# Patient Record
Sex: FEMALE | Race: WHITE | NOT HISPANIC OR LATINO | Employment: OTHER | URBAN - METROPOLITAN AREA
[De-identification: names, ages, dates, MRNs, and addresses within clinical notes are randomized per-mention and may not be internally consistent; named-entity substitution may affect disease eponyms.]

---

## 2021-03-03 DIAGNOSIS — Z23 NEED FOR VACCINATION: ICD-10-CM

## 2021-10-08 ENCOUNTER — TELEPHONE (OUTPATIENT)
Dept: SCHEDULING | Facility: IMAGING CENTER | Age: 71
End: 2021-10-08

## 2021-10-12 ENCOUNTER — OFFICE VISIT (OUTPATIENT)
Dept: MEDICAL GROUP | Facility: PHYSICIAN GROUP | Age: 71
End: 2021-10-12
Payer: MEDICARE

## 2021-10-12 VITALS
OXYGEN SATURATION: 96 % | RESPIRATION RATE: 16 BRPM | BODY MASS INDEX: 30.25 KG/M2 | HEIGHT: 68 IN | TEMPERATURE: 98.2 F | WEIGHT: 199.6 LBS | SYSTOLIC BLOOD PRESSURE: 130 MMHG | DIASTOLIC BLOOD PRESSURE: 86 MMHG | HEART RATE: 86 BPM

## 2021-10-12 DIAGNOSIS — T78.49XA ALLERGIC REACTION TO COVID-19 VACCINE: ICD-10-CM

## 2021-10-12 DIAGNOSIS — Z13.0 SCREENING FOR DEFICIENCY ANEMIA: ICD-10-CM

## 2021-10-12 DIAGNOSIS — Z01.00 ENCOUNTER FOR VISION SCREENING: ICD-10-CM

## 2021-10-12 DIAGNOSIS — M25.561 CHRONIC PAIN OF BOTH KNEES: ICD-10-CM

## 2021-10-12 DIAGNOSIS — Z12.11 SCREENING FOR COLORECTAL CANCER: ICD-10-CM

## 2021-10-12 DIAGNOSIS — G89.29 CHRONIC PAIN OF BOTH KNEES: ICD-10-CM

## 2021-10-12 DIAGNOSIS — H16.009 CORNEAL ULCER, UNSPECIFIED LATERALITY: ICD-10-CM

## 2021-10-12 DIAGNOSIS — J45.20 MILD INTERMITTENT ASTHMA WITHOUT COMPLICATION: ICD-10-CM

## 2021-10-12 DIAGNOSIS — E89.0 POSTOPERATIVE HYPOTHYROIDISM: ICD-10-CM

## 2021-10-12 DIAGNOSIS — Z12.12 SCREENING FOR COLORECTAL CANCER: ICD-10-CM

## 2021-10-12 DIAGNOSIS — G89.29 CHRONIC LOW BACK PAIN, UNSPECIFIED BACK PAIN LATERALITY, UNSPECIFIED WHETHER SCIATICA PRESENT: ICD-10-CM

## 2021-10-12 DIAGNOSIS — M25.562 CHRONIC PAIN OF BOTH KNEES: ICD-10-CM

## 2021-10-12 DIAGNOSIS — F33.0 MILD EPISODE OF RECURRENT MAJOR DEPRESSIVE DISORDER (HCC): ICD-10-CM

## 2021-10-12 DIAGNOSIS — Z13.6 SCREENING FOR CARDIOVASCULAR CONDITION: ICD-10-CM

## 2021-10-12 DIAGNOSIS — K21.9 GASTROESOPHAGEAL REFLUX DISEASE, UNSPECIFIED WHETHER ESOPHAGITIS PRESENT: ICD-10-CM

## 2021-10-12 DIAGNOSIS — Z13.1 ENCOUNTER FOR SCREENING FOR DIABETES MELLITUS: ICD-10-CM

## 2021-10-12 DIAGNOSIS — Z11.52 ENCOUNTER FOR SCREENING FOR COVID-19: ICD-10-CM

## 2021-10-12 DIAGNOSIS — M54.50 CHRONIC LOW BACK PAIN, UNSPECIFIED BACK PAIN LATERALITY, UNSPECIFIED WHETHER SCIATICA PRESENT: ICD-10-CM

## 2021-10-12 DIAGNOSIS — Z78.0 POST-MENOPAUSAL: ICD-10-CM

## 2021-10-12 PROBLEM — F32.9 MDD (MAJOR DEPRESSIVE DISORDER): Status: ACTIVE | Noted: 2021-10-12

## 2021-10-12 PROBLEM — Z87.828 HISTORY OF CORNEAL ABRASION: Status: ACTIVE | Noted: 2021-10-12

## 2021-10-12 PROBLEM — J45.909 ASTHMA: Status: ACTIVE | Noted: 2021-10-12

## 2021-10-12 PROCEDURE — 99204 OFFICE O/P NEW MOD 45 MIN: CPT | Performed by: INTERNAL MEDICINE

## 2021-10-12 ASSESSMENT — PATIENT HEALTH QUESTIONNAIRE - PHQ9: CLINICAL INTERPRETATION OF PHQ2 SCORE: 0

## 2021-10-12 NOTE — PROGRESS NOTES
Subjective:     CC: Establish care    HISTORY OF THE PRESENT ILLNESS: Patient is a 71 y.o. female. This pleasant patient is here today to establish care and discuss the following issues:    The patient has recently relocated to the [facility].  She states she had the Alberto [employer] Alberto vaccine and developed a severe allergic reaction.  Her symptoms were whole body rash and edema, as well as some throat swelling.  No shortness of breath.  No anaphylaxis.  She did not receive care at the ED or hospital.  She was managed by her PCP.  She required 6 weeks of prednisone. The patient would like to have her antibody level tested to help determine if and when she should get a COVID-19 booster given her severe allergic reaction.    The patient had a thyroidectomy for multiple thyroid nodules, with pathology benign.  The patient has been stable on her regimen of levothyroxine 100 mcg daily and liothyronine 5 mcg daily.  She does report a recent 50 pound weight gain.    The patient reports chronic back and bilateral knee pain.  Imaging has shown osteoarthritis/degenerative disc disease.  The patient had left knee arthroscopy versus TKR in 2013 and right knee arthroscopy versus TKR in 2020.  She has also received corticosteroid injections to her lower back and bilateral knees.  She takes cyclobenzaprine 10 mg as needed and Norco 5-325 as needed.  She does not need refills of the of these medications at this time. She also had a lower back procedure in 1991 that was called Chemo Papaine, it was a papaya injection that was popular during that time.    The patient reports recurrent corneal ulcers for which she requires ciprofloxacin 0.3% eyedrops for the recurrences.    Allergies: Patient has no allergy information on record.    Current Outpatient Medications Ordered in Epic   Medication Sig Dispense Refill   • liothyronine (CYTOMEL) 5 MCG Tab Take 5 mcg by mouth every day.     • levothyroxine (SYNTHROID) 100 MCG Tab Take 100  mcg by mouth every morning on an empty stomach.     • omeprazole (PRILOSEC) 20 MG delayed-release capsule Take 20 mg by mouth every day.     • escitalopram (LEXAPRO) 10 MG Tab Take 10 mg by mouth every day.     • budesonide-formoterol (SYMBICORT) 160-4.5 MCG/ACT Aerosol Inhale 2 Puffs 2 times a day.     • cyclobenzaprine (FLEXERIL) 10 mg Tab Take 10 mg by mouth 3 times a day as needed.     • ciprofloxacin (CILOXIN) 0.3 % Solution Administer 1 Drop into both eyes 2 times a day as needed for Other. for recurrent corneal ulceration     • HYDROcodone-acetaminophen (NORCO) 5-325 MG Tab per tablet Take 1 Tablet by mouth every four hours as needed for Moderate Pain.     • Omega-3 Fatty Acids (FISH OIL) 1200 MG Cap Take 2,400 mg by mouth every day.     • Ferrous Sulfate 27 MG Tab Take 27 mg by mouth every day.     • Multiple Vitamins-Minerals (CENTRUM WOMEN) Tab Take 1 Tablet by mouth every day.     • Cholecalciferol (VITAMIN D) 2000 UNIT Tab Take 2,000 Units by mouth every day.     • Zinc 50 MG Cap Take 50 mg by mouth every day.     • Calcium Carb-Cholecalciferol 600-800 MG-UNIT Tab Take 1 Tablet by mouth every day.       No current [facility]-ordered facility-administered medications on file.       History reviewed. No pertinent past medical history.    Past Surgical History:   Procedure Laterality Date   • THYROIDECTOMY  2020   • KNEE ARTHROSCOPY Right 2020   • CHOLECYSTECTOMY  2018   • KNEE ARTHROSCOPY Left 2013   • SINUSOTOMIES  2001   • ABDOMINAL HYSTERECTOMY TOTAL  1986       Social History     Tobacco Use   • Smoking status: Light Tobacco Smoker   • Smokeless tobacco: Never Used   • Tobacco comment: 1 per day   Vaping Use   • Vaping Use: Never used   Substance Use Topics   • Alcohol use: Yes     Comment: seldom   • Drug use: Never       Social History     Social History Narrative   • Not on file       Family History   Problem Relation Age of Onset   • Alzheimer's Disease Mother    • Heart Disease Father        Health  "Maintenance: Completed    ROS:   Gen: no fevers/chills  Pulm: no sob, no cough  CV: no chest pain, no palpitations  GI: no nausea/vomiting, no diarrhea  Neuro: no headaches, no numbness/tingling      Objective:     Exam: /86 (BP Location: Right arm, Patient Position: Sitting, BP Cuff Size: Adult)   Pulse 86   Temp 36.8 °C (98.2 °F) (Temporal)   Resp 16   Ht 1.727 m (5' 8\")   Wt 90.5 kg (199 lb 9.6 oz)   SpO2 96%  Body mass index is 30.35 kg/m².    General: Normal appearing. No distress.  HEENT: Normocephalic. Eyes conjunctiva clear lids without ptosis, pupils equal and reactive to light accommodation, oropharynx is without erythema, edema or exudates.   Pulmonary: Clear to ausculation.  Normal effort. No rales, ronchi, or wheezing.  Cardiovascular: Regular rate and rhythm without murmur.   Abdomen: Soft, nontender, nondistended.   Musculoskeletal: No extremity cyanosis, clubbing, or edema.  Psych: Normal mood and affect. Alert and oriented x3. Judgment and insight is normal.    Assessment & Plan:   71 y.o. female with the following -    Allergic reaction to COVID-19 vaccine   Encounter for screening for COVID-19  Acute condition, resolved.  The patient would like to have her antibody level tested to help determine if and when she should get a COVID-19 booster, as she had a severe reaction to the Alberto [employer] Alberto vaccine which consisted of diffuse body rash and edema with throat swelling.  No shortness of breath.  No anaphylaxis.  Did not require ED visit or hospitalization.  Was given prednisone by her PCP.  Resolved over 6-week period.  - Covid-19 IgG ([place]); Future    Postoperative hypothyroidism  This is a chronic condition.  Ongoing.  The patient had a thyroidectomy for multiple thyroid nodules, with pathology benign.  The patient has been stable on her regimen of levothyroxine 100 mcg daily and liothyronine 5 mcg daily.  She does report a recent 50 pound weight gain.  -Continue levothyroxine 100 " mcg daily  -Continue liothyronine 5 mcg daily  - TSH; Future  - FREE THYROXINE; Future  - TRIIDOTHYRONINE; Future    Mild episode of recurrent major depressive disorder (HCC)  This is a chronic condition.  Well-controlled.  The patient has been on escitalopram 10 mg daily for many years.  -Continue escitalopram 10 mg daily    Mild intermittent asthma without complication  This is a chronic condition, ongoing.  The patient is on Symbicort 2 puffs twice daily.  -Continue Symbicort 2 puffs twice daily    Gastroesophageal reflux disease, unspecified whether esophagitis present  This is a chronic condition.  Well-controlled.  The patient reports her symptoms are well controlled on omeprazole 20 mg daily.  -Continue omeprazole 20 mg daily    Chronic low back pain, unspecified back pain laterality, unspecified whether sciatica present  Chronic pain of both knees  This is a chronic condition.  Stable.  The patient had left knee arthroscopy versus TKR in 2013 and right knee arthroscopy versus TKR in 2020.  She has also received corticosteroid injections to her lower back and bilateral knees.  She takes cyclobenzaprine 10 mg as needed and Norco 5-325 as needed.  She does not need refills of the of these medications at this time.  I informed the patient that when she does need a refill she will need to come in for an in person visit to sign a controlled substance treatment agreement.  -Continue Norco 5-325 on an as-needed basis  -Continue cyclobenzaprine 10 mg on an as-needed basis    Corneal ulcer, unspecified laterality  This is a chronic condition.  Recurrent.  The patient reports recurrent corneal ulcers for which she requires ciprofloxacin 0.3% eyedrops for the recurrences.  -Continue ciprofloxacin 0.3% eyedrops as needed    Screening for deficiency anemia  - CBC WITHOUT DIFFERENTIAL; Future    Encounter for screening for diabetes mellitus  - Comp Metabolic Panel; Future    Screening for cardiovascular condition  - Lipid  Profile; Future    Post-menopausal  - DS-BONE DENSITY STUDY (DEXA); Future    Encounter for vision screening  - REFERRAL TO OPHTHALMOLOGY    Screening for colorectal cancer  - REFERRAL TO GI FOR COLONOSCOPY    Return in about 3 months (around 1/12/2022) for Controlled Substance.    Please note that this dictation was created using voice recognition software. I have made every reasonable attempt to correct obvious errors, but I expect that there are errors of grammar and possibly content that I did not discover before finalizing the note.

## 2021-10-12 NOTE — LETTER
[facility]  Conchis Vargas M.D.  [address] [place]  [address]  Phone: [phone]  Fax: [phone]   Authorization for Release/Disclosure of   Protected Health Information   Name: SHANNON BELTRE : 1950 SSN: [id number]   Address: [address] [place] [address]  [place] [address] Phone:    [phone] (home)    I authorize the entity listed below to release/disclose the PHI below to:   [facility]/No primary care provider on file. and Conchis Vargas M.D.   Provider or Entity Name: Dr Nicolas Mcgill     Address   City, State, Zip   Phone:      Fax:[phone]     Reason for request: continuity of care   Information to be released:    [  ] LAST COLONOSCOPY,  including any PATH REPORT and follow-up  [  ] LAST FIT/COLOGUARD RESULT [  ] LAST DEXA  [  ] LAST MAMMOGRAM  [  ] LAST PAP  [  ] LAST LABS [  ] RETINA EXAM REPORT  [  ] IMMUNIZATION RECORDS  [  ] Release all info      [  ] Check here and initial the line next to each item to release ALL health information INCLUDING  _____ Care and treatment for drug and / or alcohol abuse  _____ HIV testing, infection status, or AIDS  _____ Genetic Testing    DATES OF SERVICE OR TIME PERIOD TO BE DISCLOSED: _____________  I understand and acknowledge that:  * This Authorization may be revoked at any time by you in writing, except if your health information has already been used or disclosed.  * Your health information that will be used or disclosed as a result of you signing this authorization could be re-disclosed by the recipient. If this occurs, your re-disclosed health information may no longer be protected by State or Federal laws.  * You may refuse to sign this Authorization. Your refusal will not affect your ability to obtain treatment.  * This Authorization becomes effective upon signing and will  on (date) __________.      If no date is indicated, this Authorization will  one (1) year from the signature date.    Name: Shannon Beltre    Signature:    Date:     10/12/2021       PLEASE FAX REQUESTED RECORDS BACK TO: [phone]

## 2021-10-14 PROBLEM — M54.50 CHRONIC LOWER BACK PAIN: Status: ACTIVE | Noted: 2021-10-14

## 2021-10-14 PROBLEM — T78.49XA ALLERGIC REACTION TO COVID-19 VACCINE: Status: ACTIVE | Noted: 2021-10-14

## 2021-10-14 PROBLEM — G89.29 CHRONIC PAIN OF BOTH KNEES: Status: ACTIVE | Noted: 2021-10-14

## 2021-10-14 PROBLEM — T50.B95A ADVERSE REACTION TO COVID-19 VACCINE: Status: ACTIVE | Noted: 2021-10-14

## 2021-10-14 PROBLEM — H16.009 CORNEAL ULCER: Status: ACTIVE | Noted: 2021-10-12

## 2021-10-14 PROBLEM — M25.562 CHRONIC PAIN OF BOTH KNEES: Status: ACTIVE | Noted: 2021-10-14

## 2021-10-14 PROBLEM — M25.561 CHRONIC PAIN OF BOTH KNEES: Status: ACTIVE | Noted: 2021-10-14

## 2021-10-14 PROBLEM — G89.29 CHRONIC LOWER BACK PAIN: Status: ACTIVE | Noted: 2021-10-14

## 2021-10-14 RX ORDER — OMEPRAZOLE 10 MG/1
10 CAPSULE, DELAYED RELEASE ORAL DAILY
COMMUNITY
End: 2021-10-14

## 2021-10-14 RX ORDER — LEVOTHYROXINE SODIUM 0.1 MG/1
100 TABLET ORAL
COMMUNITY
End: 2022-03-03 | Stop reason: SDUPTHER

## 2021-10-14 RX ORDER — ESCITALOPRAM OXALATE 10 MG/1
10 TABLET ORAL DAILY
COMMUNITY
End: 2022-05-25

## 2021-10-14 RX ORDER — HYDROCODONE BITARTRATE AND ACETAMINOPHEN 5; 325 MG/1; MG/1
1 TABLET ORAL EVERY 4 HOURS PRN
COMMUNITY
End: 2022-01-18 | Stop reason: SDUPTHER

## 2021-10-14 RX ORDER — AMOXICILLIN 500 MG
2400 CAPSULE ORAL DAILY
COMMUNITY

## 2021-10-14 RX ORDER — LIOTHYRONINE SODIUM 5 UG/1
5 TABLET ORAL DAILY
COMMUNITY
End: 2022-03-03 | Stop reason: SDUPTHER

## 2021-10-14 RX ORDER — CIPROFLOXACIN HYDROCHLORIDE 3.5 MG/ML
1 SOLUTION/ DROPS TOPICAL 2 TIMES DAILY PRN
COMMUNITY
End: 2024-01-31

## 2021-10-14 RX ORDER — CYCLOBENZAPRINE HCL 10 MG
10 TABLET ORAL 3 TIMES DAILY PRN
COMMUNITY

## 2021-10-14 RX ORDER — OMEPRAZOLE 20 MG/1
20 CAPSULE, DELAYED RELEASE ORAL DAILY
COMMUNITY
End: 2022-10-07 | Stop reason: SDUPTHER

## 2021-10-14 RX ORDER — CHOLECALCIFEROL (VITAMIN D3) 50 MCG
2000 TABLET ORAL DAILY
COMMUNITY

## 2021-10-14 RX ORDER — BUDESONIDE AND FORMOTEROL FUMARATE DIHYDRATE 160; 4.5 UG/1; UG/1
2 AEROSOL RESPIRATORY (INHALATION) 2 TIMES DAILY
COMMUNITY
End: 2023-03-28 | Stop reason: SDUPTHER

## 2021-11-09 ENCOUNTER — HOSPITAL ENCOUNTER (OUTPATIENT)
Dept: LAB | Facility: MEDICAL CENTER | Age: 71
End: 2021-11-09
Attending: INTERNAL MEDICINE
Payer: MEDICARE

## 2021-11-09 DIAGNOSIS — Z11.52 ENCOUNTER FOR SCREENING FOR COVID-19: ICD-10-CM

## 2021-11-09 DIAGNOSIS — E89.0 POSTOPERATIVE HYPOTHYROIDISM: ICD-10-CM

## 2021-11-09 DIAGNOSIS — Z13.6 SCREENING FOR CARDIOVASCULAR CONDITION: ICD-10-CM

## 2021-11-09 DIAGNOSIS — Z13.1 ENCOUNTER FOR SCREENING FOR DIABETES MELLITUS: ICD-10-CM

## 2021-11-09 DIAGNOSIS — Z13.0 SCREENING FOR DEFICIENCY ANEMIA: ICD-10-CM

## 2021-11-09 LAB
ALBUMIN SERPL BCP-MCNC: 4.6 G/DL (ref 3.2–4.9)
ALBUMIN/GLOB SERPL: 1.6 G/DL
ALP SERPL-CCNC: 84 U/L (ref 30–99)
ALT SERPL-CCNC: 14 U/L (ref 2–50)
ANION GAP SERPL CALC-SCNC: 13 MMOL/L (ref 7–16)
AST SERPL-CCNC: 16 U/L (ref 12–45)
BILIRUB SERPL-MCNC: 0.3 MG/DL (ref 0.1–1.5)
BUN SERPL-MCNC: 11 MG/DL (ref 8–22)
CALCIUM SERPL-MCNC: 9.4 MG/DL (ref 8.5–10.5)
CHLORIDE SERPL-SCNC: 104 MMOL/L (ref 96–112)
CHOLEST SERPL-MCNC: 219 MG/DL (ref 100–199)
CO2 SERPL-SCNC: 23 MMOL/L (ref 20–33)
CREAT SERPL-MCNC: 0.66 MG/DL (ref 0.5–1.4)
ERYTHROCYTE [DISTWIDTH] IN BLOOD BY AUTOMATED COUNT: 49.1 FL (ref 35.9–50)
GLOBULIN SER CALC-MCNC: 2.9 G/DL (ref 1.9–3.5)
GLUCOSE SERPL-MCNC: 87 MG/DL (ref 65–99)
HCT VFR BLD AUTO: 47.5 % (ref 37–47)
HDLC SERPL-MCNC: 46 MG/DL
HGB BLD-MCNC: 15.5 G/DL (ref 12–16)
LDLC SERPL CALC-MCNC: 137 MG/DL
MCH RBC QN AUTO: 31.3 PG (ref 27–33)
MCHC RBC AUTO-ENTMCNC: 32.6 G/DL (ref 33.6–35)
MCV RBC AUTO: 95.8 FL (ref 81.4–97.8)
PLATELET # BLD AUTO: 367 K/UL (ref 164–446)
PMV BLD AUTO: 10.8 FL (ref 9–12.9)
POTASSIUM SERPL-SCNC: 3.8 MMOL/L (ref 3.6–5.5)
PROT SERPL-MCNC: 7.5 G/DL (ref 6–8.2)
RBC # BLD AUTO: 4.96 M/UL (ref 4.2–5.4)
SODIUM SERPL-SCNC: 140 MMOL/L (ref 135–145)
T3 SERPL-MCNC: 115 NG/DL (ref 60–181)
T4 FREE SERPL-MCNC: 1.35 NG/DL (ref 0.93–1.7)
TRIGL SERPL-MCNC: 182 MG/DL (ref 0–149)
TSH SERPL DL<=0.005 MIU/L-ACNC: 0.81 UIU/ML (ref 0.38–5.33)
WBC # BLD AUTO: 11.6 K/UL (ref 4.8–10.8)

## 2021-11-09 PROCEDURE — 84480 ASSAY TRIIODOTHYRONINE (T3): CPT

## 2021-11-09 PROCEDURE — 86769 SARS-COV-2 COVID-19 ANTIBODY: CPT

## 2021-11-09 PROCEDURE — 85027 COMPLETE CBC AUTOMATED: CPT

## 2021-11-09 PROCEDURE — 36415 COLL VENOUS BLD VENIPUNCTURE: CPT

## 2021-11-09 PROCEDURE — 84439 ASSAY OF FREE THYROXINE: CPT

## 2021-11-09 PROCEDURE — 80053 COMPREHEN METABOLIC PANEL: CPT

## 2021-11-09 PROCEDURE — 84443 ASSAY THYROID STIM HORMONE: CPT

## 2021-11-09 PROCEDURE — 80061 LIPID PANEL: CPT

## 2021-11-10 ENCOUNTER — TELEPHONE (OUTPATIENT)
Dept: MEDICAL GROUP | Facility: PHYSICIAN GROUP | Age: 71
End: 2021-11-10

## 2021-11-10 NOTE — TELEPHONE ENCOUNTER
LM for patient to call office regarding her most recent labs that were reviewed by Dr. Vargas.  IS

## 2021-11-11 LAB
SARS-COV-2 IGG SERPL IA-ACNC: <1 IV
SARS-COV-2 IGG SERPL QL IA: NEGATIVE

## 2021-11-11 NOTE — TELEPHONE ENCOUNTER
LM for patient again #2, she was not available, I asked the patient to call our office anytime this week to get her results.  [phone].

## 2021-11-11 NOTE — TELEPHONE ENCOUNTER
----- Message from Conchis Vargas M.D. sent at 11/10/2021  6:11 AM PST -----  Travis Pandya,  This patient does not have my chart.  Please call her with her lab results.    -Her cholesterol is elevated.  In addition, her 10-year risk for heart attack or stroke is elevated.  I would recommend she start treatment with a cholesterol medication.  Please have her call and schedule an appointment with me to discuss her treatment options.    -Her electrolytes, fasting glucose, kidney function, and liver function are normal.    -Her thyroid values are normal.    Thanks,Conchis

## 2021-11-16 ENCOUNTER — TELEPHONE (OUTPATIENT)
Dept: MEDICAL GROUP | Facility: PHYSICIAN GROUP | Age: 71
End: 2021-11-16

## 2021-11-16 NOTE — TELEPHONE ENCOUNTER
LM with patient 3rd attempt and patient has not called yet.  Will give her until the end of the day and if we have not heard I will send a letter out to the patient to contact the office for her results.  IS

## 2021-11-16 NOTE — TELEPHONE ENCOUNTER
Notified the patient regarding her results. Patient was schedule an appt. In December to see Dr. Vargas and review labs again and possibly start on a new med for cholesterol.

## 2021-12-03 ENCOUNTER — OFFICE VISIT (OUTPATIENT)
Dept: MEDICAL GROUP | Facility: PHYSICIAN GROUP | Age: 71
End: 2021-12-03
Payer: MEDICARE

## 2021-12-03 VITALS
WEIGHT: 186 LBS | DIASTOLIC BLOOD PRESSURE: 70 MMHG | OXYGEN SATURATION: 95 % | SYSTOLIC BLOOD PRESSURE: 112 MMHG | HEART RATE: 74 BPM | RESPIRATION RATE: 14 BRPM | HEIGHT: 68 IN | TEMPERATURE: 97.7 F | BODY MASS INDEX: 28.19 KG/M2

## 2021-12-03 DIAGNOSIS — F33.0 MILD EPISODE OF RECURRENT MAJOR DEPRESSIVE DISORDER (HCC): ICD-10-CM

## 2021-12-03 DIAGNOSIS — J45.20 MILD INTERMITTENT ASTHMA WITHOUT COMPLICATION: ICD-10-CM

## 2021-12-03 DIAGNOSIS — K21.9 GASTROESOPHAGEAL REFLUX DISEASE, UNSPECIFIED WHETHER ESOPHAGITIS PRESENT: ICD-10-CM

## 2021-12-03 DIAGNOSIS — E78.5 DYSLIPIDEMIA: ICD-10-CM

## 2021-12-03 DIAGNOSIS — E89.0 POSTOPERATIVE HYPOTHYROIDISM: ICD-10-CM

## 2021-12-03 PROCEDURE — 99214 OFFICE O/P EST MOD 30 MIN: CPT | Performed by: INTERNAL MEDICINE

## 2021-12-03 RX ORDER — ROSUVASTATIN CALCIUM 5 MG/1
5 TABLET, COATED ORAL EVERY EVENING
Qty: 90 TABLET | Refills: 3 | Status: SHIPPED | OUTPATIENT
Start: 2021-12-03 | End: 2022-11-14

## 2021-12-03 ASSESSMENT — PATIENT HEALTH QUESTIONNAIRE - PHQ9
2. FEELING DOWN, DEPRESSED, IRRITABLE, OR HOPELESS: NOT AT ALL
5. POOR APPETITE OR OVEREATING: NOT AT ALL
SUM OF ALL RESPONSES TO PHQ9 QUESTIONS 1 AND 2: 0
7. TROUBLE CONCENTRATING ON THINGS, SUCH AS READING THE NEWSPAPER OR WATCHING TELEVISION: NOT AT ALL
1. LITTLE INTEREST OR PLEASURE IN DOING THINGS: NOT AT ALL
4. FEELING TIRED OR HAVING LITTLE ENERGY: NOT AT ALL
9. THOUGHTS THAT YOU WOULD BE BETTER OFF DEAD, OR OF HURTING YOURSELF: NOT AT ALL
SUM OF ALL RESPONSES TO PHQ QUESTIONS 1-9: 0
6. FEELING BAD ABOUT YOURSELF - OR THAT YOU ARE A FAILURE OR HAVE LET YOURSELF OR YOUR FAMILY DOWN: NOT AL ALL
3. TROUBLE FALLING OR STAYING ASLEEP OR SLEEPING TOO MUCH: NOT AT ALL
8. MOVING OR SPEAKING SO SLOWLY THAT OTHER PEOPLE COULD HAVE NOTICED. OR THE OPPOSITE, BEING SO FIGETY OR RESTLESS THAT YOU HAVE BEEN MOVING AROUND A LOT MORE THAN USUAL: NOT AT ALL

## 2021-12-03 ASSESSMENT — FIBROSIS 4 INDEX: FIB4 SCORE: 0.8

## 2021-12-03 NOTE — PROGRESS NOTES
Subjective:     CC:   Chief Complaint   Patient presents with   • Lab Results         HPI:   Sola presents today to discuss the following issues:    The patient is here to follow-up on lab results.  She feels well.  No acute complaints.      History reviewed. No pertinent past medical history.    Social History     Tobacco Use   • Smoking status: Light Tobacco Smoker   • Smokeless tobacco: Never Used   • Tobacco comment: 1 per day   Vaping Use   • Vaping Use: Never used   Substance Use Topics   • Alcohol use: Yes     Comment: seldom   • Drug use: Never       Current Outpatient Medications Ordered in Epic   Medication Sig Dispense Refill   • rosuvastatin (CRESTOR) 5 MG Tab Take 1 Tablet by mouth every evening. 90 Tablet 3   • liothyronine (CYTOMEL) 5 MCG Tab Take 5 mcg by mouth every day.     • levothyroxine (SYNTHROID) 100 MCG Tab Take 100 mcg by mouth every morning on an empty stomach.     • omeprazole (PRILOSEC) 20 MG delayed-release capsule Take 20 mg by mouth every day.     • escitalopram (LEXAPRO) 10 MG Tab Take 10 mg by mouth every day.     • budesonide-formoterol (SYMBICORT) 160-4.5 MCG/ACT Aerosol Inhale 2 Puffs 2 times a day.     • cyclobenzaprine (FLEXERIL) 10 mg Tab Take 10 mg by mouth 3 times a day as needed.     • ciprofloxacin (CILOXIN) 0.3 % Solution Administer 1 Drop into both eyes 2 times a day as needed for Other. for recurrent corneal ulceration     • HYDROcodone-acetaminophen (NORCO) 5-325 MG Tab per tablet Take 1 Tablet by mouth every four hours as needed for Moderate Pain.     • Omega-3 Fatty Acids (FISH OIL) 1200 MG Cap Take 2,400 mg by mouth every day.     • Ferrous Sulfate 27 MG Tab Take 27 mg by mouth every day.     • Multiple Vitamins-Minerals (CENTRUM WOMEN) Tab Take 1 Tablet by mouth every day.     • Cholecalciferol (VITAMIN D) 2000 UNIT Tab Take 2,000 Units by mouth every day.     • Zinc 50 MG Cap Take 50 mg by mouth every day.     • Calcium Carb-Cholecalciferol 600-800 MG-UNIT Tab  "Take 1 Tablet by mouth every day.       No current [facility]-ordered facility-administered medications on file.       Allergies:  Patient has no known allergies.    Health Maintenance: Completed    ROS:   Denies any recent fevers or chills. No nausea or vomiting. No chest pains or shortness of breath.      Objective:       Exam:  /70   Pulse 74   Temp 36.5 °C (97.7 °F)   Resp 14   Ht 1.727 m (5' 8\")   Wt 84.4 kg (186 lb)   SpO2 95%   BMI 28.28 kg/m²  Body mass index is 28.28 kg/m².    Gen: Alert and oriented, No apparent distress.      Assessment & Plan:     71 y.o. female with the following -     Dyslipidemia   This is a chronic condition.  Labs from 11/9/2021 showed a total cholesterol 219, , HDL 46, triglycerides 182. The 10-year ASCVD risk score (Jocy SMITH Jr., et al., 2013) is: 11.8%  -Start trial of low-dose rosuvastatin 5 mg nightly   - rosuvastatin (CRESTOR) 5 MG Tab; Take 1 Tablet by mouth every evening.  Dispense: 90 Tablet; Refill: 3    Postoperative hypothyroidism  This is a chronic condition.  Ongoing.  The patient had a thyroidectomy for multiple thyroid nodules, with pathology benign.  The patient has been stable on her regimen of levothyroxine 100 mcg daily and liothyronine 5 mcg daily.  Labs from 11/9/2021 showed a normal TSH of 0.81, normal free T4 of 1.35, normal T3 of 115.  -Continue levothyroxine 100 mcg daily  -Continue liothyronine 5 mcg daily     Mild episode of recurrent major depressive disorder (HCC)  This is a chronic condition.  Well-controlled.  The patient has been on escitalopram 10 mg daily for many years.  -Continue escitalopram 10 mg daily     Mild intermittent asthma without complication  This is a chronic condition, ongoing.  The patient is on Symbicort 2 puffs twice daily.  -Continue Symbicort 2 puffs twice daily     Gastroesophageal reflux disease, unspecified whether esophagitis present  This is a chronic condition.  Well-controlled.  The patient reports her " symptoms are well controlled on omeprazole 20 mg daily.  -Continue omeprazole 20 mg daily       Return in about 4 weeks (around 12/31/2021) for Controlled Substance.    Please note that this dictation was created using voice recognition software. I have made every reasonable attempt to correct obvious errors, but I expect that there are errors of grammar and possibly content that I did not discover before finalizing the note.

## 2022-01-11 ENCOUNTER — OFFICE VISIT (OUTPATIENT)
Dept: MEDICAL GROUP | Facility: PHYSICIAN GROUP | Age: 72
End: 2022-01-11
Payer: MEDICARE

## 2022-01-11 VITALS
DIASTOLIC BLOOD PRESSURE: 80 MMHG | HEART RATE: 105 BPM | HEIGHT: 68 IN | RESPIRATION RATE: 14 BRPM | WEIGHT: 185 LBS | OXYGEN SATURATION: 99 % | TEMPERATURE: 98.4 F | BODY MASS INDEX: 28.04 KG/M2 | SYSTOLIC BLOOD PRESSURE: 122 MMHG

## 2022-01-11 DIAGNOSIS — F41.9 ANXIETY: ICD-10-CM

## 2022-01-11 DIAGNOSIS — M25.562 CHRONIC PAIN OF BOTH KNEES: ICD-10-CM

## 2022-01-11 DIAGNOSIS — M54.50 CHRONIC LOW BACK PAIN, UNSPECIFIED BACK PAIN LATERALITY, UNSPECIFIED WHETHER SCIATICA PRESENT: ICD-10-CM

## 2022-01-11 DIAGNOSIS — F33.0 MILD EPISODE OF RECURRENT MAJOR DEPRESSIVE DISORDER (HCC): ICD-10-CM

## 2022-01-11 DIAGNOSIS — G89.29 CHRONIC LOW BACK PAIN, UNSPECIFIED BACK PAIN LATERALITY, UNSPECIFIED WHETHER SCIATICA PRESENT: ICD-10-CM

## 2022-01-11 DIAGNOSIS — G89.29 CHRONIC PAIN OF BOTH KNEES: ICD-10-CM

## 2022-01-11 DIAGNOSIS — M25.561 CHRONIC PAIN OF BOTH KNEES: ICD-10-CM

## 2022-01-11 DIAGNOSIS — E78.5 DYSLIPIDEMIA: ICD-10-CM

## 2022-01-11 PROCEDURE — 99214 OFFICE O/P EST MOD 30 MIN: CPT | Performed by: INTERNAL MEDICINE

## 2022-01-11 RX ORDER — HYDROCODONE BITARTRATE AND ACETAMINOPHEN 5; 325 MG/1; MG/1
1 TABLET ORAL EVERY 4 HOURS PRN
Qty: 30 TABLET | Refills: 0 | Status: SHIPPED | OUTPATIENT
Start: 2022-01-11 | End: 2022-05-17 | Stop reason: SDUPTHER

## 2022-01-11 RX ORDER — ESCITALOPRAM OXALATE 20 MG/1
20 TABLET ORAL DAILY
Qty: 90 TABLET | Refills: 3 | Status: SHIPPED | OUTPATIENT
Start: 2022-01-11 | End: 2023-02-08

## 2022-01-11 RX ORDER — PREDNISONE 20 MG/1
TABLET ORAL
COMMUNITY
Start: 2021-12-01

## 2022-01-11 ASSESSMENT — FIBROSIS 4 INDEX: FIB4 SCORE: 0.8

## 2022-01-11 ASSESSMENT — PATIENT HEALTH QUESTIONNAIRE - PHQ9: CLINICAL INTERPRETATION OF PHQ2 SCORE: 0

## 2022-01-11 NOTE — PROGRESS NOTES
Subjective:     CC:   Chief Complaint   Patient presents with   • Follow-Up     Medication          HPI:   Sola presents today to discuss the following issues.    The patient is here for refill of her Norco.  She does report an increase in her anxiety for which she has previously been on Valium.  She attributed to her recent move.      History reviewed. No pertinent past medical history.    Social History     Tobacco Use   • Smoking status: Light Tobacco Smoker   • Smokeless tobacco: Never Used   • Tobacco comment: 1 per day   Vaping Use   • Vaping Use: Never used   Substance Use Topics   • Alcohol use: Yes     Comment: seldom   • Drug use: Never       Current Outpatient Medications Ordered in Epic   Medication Sig Dispense Refill   • predniSONE (DELTASONE) 20 MG Tab TAKE 1 TO 2 TABLETS BY MOUTH AS DIRECTED     • HYDROcodone-acetaminophen (NORCO) 5-325 MG Tab per tablet Take 1 Tablet by mouth every four hours as needed for up to 30 days. 30 Tablet 0   • escitalopram (LEXAPRO) 20 MG tablet Take 1 Tablet by mouth every day. 90 Tablet 3   • rosuvastatin (CRESTOR) 5 MG Tab Take 1 Tablet by mouth every evening. 90 Tablet 3   • liothyronine (CYTOMEL) 5 MCG Tab Take 5 mcg by mouth every day.     • levothyroxine (SYNTHROID) 100 MCG Tab Take 100 mcg by mouth every morning on an empty stomach.     • omeprazole (PRILOSEC) 20 MG delayed-release capsule Take 20 mg by mouth every day.     • escitalopram (LEXAPRO) 10 MG Tab Take 10 mg by mouth every day.     • budesonide-formoterol (SYMBICORT) 160-4.5 MCG/ACT Aerosol Inhale 2 Puffs 2 times a day.     • cyclobenzaprine (FLEXERIL) 10 mg Tab Take 10 mg by mouth 3 times a day as needed.     • ciprofloxacin (CILOXIN) 0.3 % Solution Administer 1 Drop into both eyes 2 times a day as needed for Other. for recurrent corneal ulceration     • HYDROcodone-acetaminophen (NORCO) 5-325 MG Tab per tablet Take 1 Tablet by mouth every four hours as needed for Moderate Pain.     • Omega-3 Fatty  "Acids (FISH OIL) 1200 MG Cap Take 2,400 mg by mouth every day.     • Ferrous Sulfate 27 MG Tab Take 27 mg by mouth every day.     • Multiple Vitamins-Minerals (CENTRUM WOMEN) Tab Take 1 Tablet by mouth every day.     • Cholecalciferol (VITAMIN D) 2000 UNIT Tab Take 2,000 Units by mouth every day.     • Zinc 50 MG Cap Take 50 mg by mouth every day.     • Calcium Carb-Cholecalciferol 600-800 MG-UNIT Tab Take 1 Tablet by mouth every day.       No current [facility]-ordered facility-administered medications on file.       Allergies:  Patient has no known allergies.    Health Maintenance: Completed    ROS:   Denies any recent fevers or chills. No nausea or vomiting. No chest pains or shortness of breath.      Objective:       Exam:  /80   Pulse (!) 105   Temp 36.9 °C (98.4 °F)   Resp 14   Ht 1.727 m (5' 8\")   Wt 83.9 kg (185 lb)   SpO2 99%   BMI 28.13 kg/m²  Body mass index is 28.13 kg/m².    Gen: Alert and oriented, No apparent distress.        Assessment & Plan:     71 y.o. female with the following -         Chronic low back pain, unspecified back pain laterality, unspecified whether sciatica present  Chronic pain of both knees  This is a chronic condition.  Stable.  The patient had left knee arthroscopy versus TKR in 2013 and right knee arthroscopy versus TKR in 2020.  She has also received corticosteroid injections to her lower back and bilateral knees.  She takes cyclobenzaprine 10 mg as needed and Norco 5-325 as needed.  Review of the patient's  did not show a previous prescription in [place]. Obtained and reviewed patient utilization report from [facility] pharmacy database on 1/11/2022 11:25 AM  prior to writing prescription for controlled substance II, III or IV per [place] bill . Based on assessment of the report, the prescription is medically necessary.   -Continue Norco 5-325 on an as-needed basis  -Continue cyclobenzaprine 10 mg on an as-needed basis  - HYDROcodone-acetaminophen (NORCO) 5-325 " MG Tab per tablet; Take 1 Tablet by mouth every four hours as needed for up to 30 days.  Dispense: 30 Tablet; Refill: 0  - Controlled Substance Treatment Agreement signed and scanned into the patient's chart on 1/11/2022      Dyslipidemia   This is a chronic condition.  Labs from 11/9/2021 showed a total cholesterol 219, , HDL 46, triglycerides 182.  The patient was recently started on rosuvastatin 5 mg nightly and reports that she is tolerating the medication well.  -Continue rosuvastatin 5 mg nightly  - Comp Metabolic Panel; Future  - Lipid Profile; Future     Mild episode of recurrent major depressive disorder (HCC)  Anxiety  This is a chronic condition.  Ongoing.  The patient has been on escitalopram 10 mg daily for many years, but is recently reporting worsening anxiety for which she has been on Valium before.   -Increase escitalopram from 10 mg daily to 20 mg daily  - escitalopram (LEXAPRO) 20 MG tablet; Take 1 Tablet by mouth every day.  Dispense: 90 Tablet; Refill: 3      Return in about 3 months (around 4/11/2022) for f/u labs.    Please note that this dictation was created using voice recognition software. I have made every reasonable attempt to correct obvious errors, but I expect that there are errors of grammar and possibly content that I did not discover before finalizing the note.

## 2022-01-18 DIAGNOSIS — G89.29 CHRONIC PAIN OF BOTH KNEES: ICD-10-CM

## 2022-01-18 DIAGNOSIS — G89.29 CHRONIC LOW BACK PAIN, UNSPECIFIED BACK PAIN LATERALITY, UNSPECIFIED WHETHER SCIATICA PRESENT: ICD-10-CM

## 2022-01-18 DIAGNOSIS — M25.562 CHRONIC PAIN OF BOTH KNEES: ICD-10-CM

## 2022-01-18 DIAGNOSIS — M54.50 CHRONIC LOW BACK PAIN, UNSPECIFIED BACK PAIN LATERALITY, UNSPECIFIED WHETHER SCIATICA PRESENT: ICD-10-CM

## 2022-01-18 DIAGNOSIS — M25.561 CHRONIC PAIN OF BOTH KNEES: ICD-10-CM

## 2022-01-18 RX ORDER — HYDROCODONE BITARTRATE AND ACETAMINOPHEN 5; 325 MG/1; MG/1
1 TABLET ORAL EVERY 4 HOURS PRN
Qty: 30 TABLET | Refills: 0 | Status: SHIPPED | OUTPATIENT
Start: 2022-01-18 | End: 2022-01-25

## 2022-03-03 RX ORDER — LEVOTHYROXINE SODIUM 0.1 MG/1
100 TABLET ORAL
Qty: 90 TABLET | Refills: 3 | Status: SHIPPED | OUTPATIENT
Start: 2022-03-03 | End: 2022-05-17 | Stop reason: SDUPTHER

## 2022-03-03 RX ORDER — LIOTHYRONINE SODIUM 5 UG/1
5 TABLET ORAL DAILY
Qty: 90 TABLET | Refills: 3 | Status: SHIPPED | OUTPATIENT
Start: 2022-03-03 | End: 2022-05-17 | Stop reason: SDUPTHER

## 2022-05-09 DIAGNOSIS — M25.561 CHRONIC PAIN OF BOTH KNEES: ICD-10-CM

## 2022-05-09 DIAGNOSIS — G89.29 CHRONIC PAIN OF BOTH KNEES: ICD-10-CM

## 2022-05-09 DIAGNOSIS — G89.29 CHRONIC LOW BACK PAIN, UNSPECIFIED BACK PAIN LATERALITY, UNSPECIFIED WHETHER SCIATICA PRESENT: ICD-10-CM

## 2022-05-09 DIAGNOSIS — M25.562 CHRONIC PAIN OF BOTH KNEES: ICD-10-CM

## 2022-05-09 DIAGNOSIS — M54.50 CHRONIC LOW BACK PAIN, UNSPECIFIED BACK PAIN LATERALITY, UNSPECIFIED WHETHER SCIATICA PRESENT: ICD-10-CM

## 2022-05-10 DIAGNOSIS — E89.0 POSTOPERATIVE HYPOTHYROIDISM: ICD-10-CM

## 2022-05-10 DIAGNOSIS — Z13.0 SCREENING FOR DEFICIENCY ANEMIA: ICD-10-CM

## 2022-05-10 RX ORDER — HYDROCODONE BITARTRATE AND ACETAMINOPHEN 5; 325 MG/1; MG/1
1 TABLET ORAL EVERY 4 HOURS PRN
Qty: 30 TABLET | Refills: 0 | OUTPATIENT
Start: 2022-05-10 | End: 2022-06-09

## 2022-05-16 NOTE — PROGRESS NOTES
Subjective:     CC:   Chief Complaint   Patient presents with   • Medication Refill   • Orders Needed     Bone density, mammogram   • Back Pain     Couple month, norco help with the pain         HPI:   Surendra Garcia is a 71-year-old female who presents for 3-month follow-up visit.  She feels well.  No acute medical complaints.  She needs refills of her thyroid medication as well as her Norco that she takes as needed for her back pain.  She is also due for mammogram and bone density study.      History reviewed. No pertinent past medical history.    Social History     Tobacco Use   • Smoking status: Light Tobacco Smoker   • Smokeless tobacco: Never Used   • Tobacco comment: 1 per day   Vaping Use   • Vaping Use: Never used   Substance Use Topics   • Alcohol use: Yes     Comment: seldom   • Drug use: Never       Current Outpatient Medications Ordered in Epic   Medication Sig Dispense Refill   • HYDROcodone-acetaminophen (NORCO) 5-325 MG Tab per tablet Take 1 Tablet by mouth every four hours as needed (pain) for up to 30 days. 30 Tablet 0   • levothyroxine (SYNTHROID) 100 MCG Tab Take 1 Tablet by mouth every morning on an empty stomach. 90 Tablet 3   • liothyronine (CYTOMEL) 5 MCG Tab Take 1 Tablet by mouth every day. 90 Tablet 3   • tretinoin (RETIN-A) 0.025 % cream APPLY CREAM TOPICALLY TO AFFECTED AREA ONCE DAILY IN THE EVENING     • predniSONE (DELTASONE) 20 MG Tab TAKE 1 TO 2 TABLETS BY MOUTH AS DIRECTED     • escitalopram (LEXAPRO) 20 MG tablet Take 1 Tablet by mouth every day. 90 Tablet 3   • rosuvastatin (CRESTOR) 5 MG Tab Take 1 Tablet by mouth every evening. 90 Tablet 3   • omeprazole (PRILOSEC) 20 MG delayed-release capsule Take 20 mg by mouth every day.     • budesonide-formoterol (SYMBICORT) 160-4.5 MCG/ACT Aerosol Inhale 2 Puffs 2 times a day.     • cyclobenzaprine (FLEXERIL) 10 mg Tab Take 10 mg by mouth 3 times a day as needed.     • ciprofloxacin (CILOXIN) 0.3 % Solution Administer 1 Drop into both eyes 2  "times a day as needed for Other. for recurrent corneal ulceration     • Omega-3 Fatty Acids (FISH OIL) 1200 MG Cap Take 2,400 mg by mouth every day.     • Multiple Vitamins-Minerals (CENTRUM WOMEN) Tab Take 1 Tablet by mouth every day.     • Cholecalciferol (VITAMIN D) 2000 UNIT Tab Take 2,000 Units by mouth every day.     • Zinc 50 MG Cap Take 50 mg by mouth every day.     • Calcium Carb-Cholecalciferol 600-800 MG-UNIT Tab Take 1 Tablet by mouth every day.     • escitalopram (LEXAPRO) 10 MG Tab Take 10 mg by mouth every day. (Patient not taking: Reported on 5/17/2022)     • Ferrous Sulfate 27 MG Tab Take 27 mg by mouth every day. (Patient not taking: Reported on 5/17/2022)       No current [facility]-ordered facility-administered medications on file.       Allergies:  Patient has no known allergies.    Health Maintenance: Completed      ROS:   Denies any recent fevers or chills. No nausea or vomiting. No chest pains or shortness of breath.      Objective:       Exam:  /76   Pulse 100   Temp 36.9 °C (98.4 °F) (Temporal)   Resp 18   Ht 1.727 m (5' 8\")   Wt 88.5 kg (195 lb)   SpO2 96%   BMI 29.65 kg/m²  Body mass index is 29.65 kg/m².    Gen: Alert and oriented, No apparent distress.  Lungs: Normal effort, CTA bilaterally, no wheezes, rhonchi, or rales  CV: Regular rate and rhythm. No murmurs, rubs, or gallops.  Ext: No clubbing, cyanosis, edema.    Assessment & Plan:     71 y.o. female with the following -     Chronic low back pain, unspecified back pain laterality, unspecified whether sciatica present  Chronic pain of both knees  Chronic medical condition.  Stable.  The patient had left knee arthroscopy versus TKR in 2013 and right knee arthroscopy versus TKR in 2020.  She has also received corticosteroid injections to her lower back and bilateral knees.  She takes cyclobenzaprine 10 mg as needed and Norco 5-325 as needed.   Review of the patient's  shows that she was last given a prescription for Norco " 5-325 mg, dispo #28, on 1/16/2022. Obtained and reviewed patient utilization report from [facility] pharmacy database on 5/25/2022 2:47 AM  prior to writing prescription for controlled substance II, III or IV per [place] bill . Based on assessment of the report, the prescription is medically necessary.   -Continue Norco 5-325 on an as-needed basis  -Continue cyclobenzaprine 10 mg on an as-needed basis  - Controlled Substance Treatment Agreement signed and scanned into the patient's chart on 1/11/2022   - HYDROcodone-acetaminophen (NORCO) 5-325 MG Tab per tablet; Take 1 Tablet by mouth every four hours as needed (pain) for up to 30 days.  Dispense: 30 Tablet; Refill: 0    Postoperative hypothyroidism  Chronic medical condition.  Ongoing.  The patient had a thyroidectomy for multiple thyroid nodules, with pathology benign.  The patient has been stable on her regimen of levothyroxine 100 mcg daily and liothyronine 5 mcg daily.  Labs from 11/9/2021 showed a normal TSH of 0.81, normal free T4 of 1.35, normal T3 of 115.  -Continue levothyroxine 100 mcg daily  -Continue liothyronine 5 mcg daily  - levothyroxine (SYNTHROID) 100 MCG Tab; Take 1 Tablet by mouth every morning on an empty stomach.  Dispense: 90 Tablet; Refill: 3  - liothyronine (CYTOMEL) 5 MCG Tab; Take 1 Tablet by mouth every day.  Dispense: 90 Tablet; Refill: 3  - FREE THYROXINE; Future  - TRIIDOTHYRONINE; Future  - TSH; Future    Mixed hyperlipidemia  Chronic medical condition.  LDL not at goal of less than 100.  The patient is on rosuvastatin 5 mg nightly.  She denies statin associated myalgias.  Labs from 11/9/2021 showed a total cholesterol 219, , HDL 46, triglycerides 182.    5  -Continue rosuvastatin 5 mg nightly  - Comp Metabolic Panel; Future  - Lipid Profile; Future     Mild episode of recurrent major depressive disorder (HCC)  DIMITRI (generalized anxiety disorder)  Chronic medical condition.  Ongoing.    The patient's symptoms are  controlled on escitalopram 20 mg daily.  -Continue escitalopram 20 mg daily    Screening for deficiency anemia  - CBC WITH DIFFERENTIAL; Future    Post-menopausal  - DS-BONE DENSITY STUDY (DEXA); Future      Return in about 8 weeks (around 7/12/2022) for Controlled Substance, f/u labs.    Please note that this dictation was created using voice recognition software. I have made every reasonable attempt to correct obvious errors, but I expect that there are errors of grammar and possibly content that I did not discover before finalizing the note.

## 2022-05-17 ENCOUNTER — OFFICE VISIT (OUTPATIENT)
Dept: MEDICAL GROUP | Facility: PHYSICIAN GROUP | Age: 72
End: 2022-05-17
Payer: MEDICARE

## 2022-05-17 VITALS
HEIGHT: 68 IN | OXYGEN SATURATION: 96 % | TEMPERATURE: 98.4 F | WEIGHT: 195 LBS | DIASTOLIC BLOOD PRESSURE: 76 MMHG | SYSTOLIC BLOOD PRESSURE: 102 MMHG | BODY MASS INDEX: 29.55 KG/M2 | HEART RATE: 100 BPM | RESPIRATION RATE: 18 BRPM

## 2022-05-17 DIAGNOSIS — Z13.0 SCREENING FOR DEFICIENCY ANEMIA: ICD-10-CM

## 2022-05-17 DIAGNOSIS — F41.1 GAD (GENERALIZED ANXIETY DISORDER): ICD-10-CM

## 2022-05-17 DIAGNOSIS — E78.2 MIXED HYPERLIPIDEMIA: ICD-10-CM

## 2022-05-17 DIAGNOSIS — G89.29 CHRONIC LOW BACK PAIN, UNSPECIFIED BACK PAIN LATERALITY, UNSPECIFIED WHETHER SCIATICA PRESENT: ICD-10-CM

## 2022-05-17 DIAGNOSIS — F33.0 MILD EPISODE OF RECURRENT MAJOR DEPRESSIVE DISORDER (HCC): ICD-10-CM

## 2022-05-17 DIAGNOSIS — G89.29 CHRONIC PAIN OF BOTH KNEES: ICD-10-CM

## 2022-05-17 DIAGNOSIS — E89.0 POSTOPERATIVE HYPOTHYROIDISM: ICD-10-CM

## 2022-05-17 DIAGNOSIS — M25.561 CHRONIC PAIN OF BOTH KNEES: ICD-10-CM

## 2022-05-17 DIAGNOSIS — M54.50 CHRONIC LOW BACK PAIN, UNSPECIFIED BACK PAIN LATERALITY, UNSPECIFIED WHETHER SCIATICA PRESENT: ICD-10-CM

## 2022-05-17 DIAGNOSIS — Z78.0 POST-MENOPAUSAL: ICD-10-CM

## 2022-05-17 DIAGNOSIS — M25.562 CHRONIC PAIN OF BOTH KNEES: ICD-10-CM

## 2022-05-17 PROCEDURE — 99214 OFFICE O/P EST MOD 30 MIN: CPT | Performed by: INTERNAL MEDICINE

## 2022-05-17 RX ORDER — LIOTHYRONINE SODIUM 5 UG/1
5 TABLET ORAL DAILY
Qty: 90 TABLET | Refills: 3 | Status: SHIPPED | OUTPATIENT
Start: 2022-05-17 | End: 2023-05-08

## 2022-05-17 RX ORDER — LEVOTHYROXINE SODIUM 0.1 MG/1
100 TABLET ORAL
Qty: 90 TABLET | Refills: 3 | Status: SHIPPED | OUTPATIENT
Start: 2022-05-17 | End: 2023-05-08

## 2022-05-17 RX ORDER — HYDROCODONE BITARTRATE AND ACETAMINOPHEN 5; 325 MG/1; MG/1
1 TABLET ORAL EVERY 4 HOURS PRN
Qty: 30 TABLET | Refills: 0 | Status: SHIPPED | OUTPATIENT
Start: 2022-05-17 | End: 2022-07-18 | Stop reason: SDUPTHER

## 2022-05-17 ASSESSMENT — FIBROSIS 4 INDEX: FIB4 SCORE: 0.8

## 2022-05-25 PROBLEM — E78.2 MIXED HYPERLIPIDEMIA: Status: ACTIVE | Noted: 2021-12-03

## 2022-05-25 PROBLEM — F41.1 GAD (GENERALIZED ANXIETY DISORDER): Status: ACTIVE | Noted: 2022-01-11

## 2022-05-27 ENCOUNTER — HOSPITAL ENCOUNTER (OUTPATIENT)
Dept: RADIOLOGY | Facility: MEDICAL CENTER | Age: 72
End: 2022-05-27
Attending: INTERNAL MEDICINE
Payer: MEDICARE

## 2022-05-27 DIAGNOSIS — Z78.0 POST-MENOPAUSAL: ICD-10-CM

## 2022-05-27 PROCEDURE — 77080 DXA BONE DENSITY AXIAL: CPT

## 2022-07-12 ENCOUNTER — HOSPITAL ENCOUNTER (OUTPATIENT)
Dept: LAB | Facility: MEDICAL CENTER | Age: 72
End: 2022-07-12
Attending: INTERNAL MEDICINE
Payer: MEDICARE

## 2022-07-12 DIAGNOSIS — E89.0 POSTOPERATIVE HYPOTHYROIDISM: ICD-10-CM

## 2022-07-12 DIAGNOSIS — E78.2 MIXED HYPERLIPIDEMIA: ICD-10-CM

## 2022-07-12 DIAGNOSIS — Z13.0 SCREENING FOR DEFICIENCY ANEMIA: ICD-10-CM

## 2022-07-12 LAB
ALBUMIN SERPL BCP-MCNC: 4.2 G/DL (ref 3.2–4.9)
ALBUMIN/GLOB SERPL: 1.4 G/DL
ALP SERPL-CCNC: 87 U/L (ref 30–99)
ALT SERPL-CCNC: 11 U/L (ref 2–50)
ANION GAP SERPL CALC-SCNC: 13 MMOL/L (ref 7–16)
AST SERPL-CCNC: 8 U/L (ref 12–45)
BASOPHILS # BLD AUTO: 0.9 % (ref 0–1.8)
BASOPHILS # BLD: 0.08 K/UL (ref 0–0.12)
BILIRUB SERPL-MCNC: 0.4 MG/DL (ref 0.1–1.5)
BUN SERPL-MCNC: 13 MG/DL (ref 8–22)
CALCIUM SERPL-MCNC: 9.3 MG/DL (ref 8.5–10.5)
CHLORIDE SERPL-SCNC: 105 MMOL/L (ref 96–112)
CHOLEST SERPL-MCNC: 159 MG/DL (ref 100–199)
CO2 SERPL-SCNC: 20 MMOL/L (ref 20–33)
CREAT SERPL-MCNC: 0.72 MG/DL (ref 0.5–1.4)
EOSINOPHIL # BLD AUTO: 0.43 K/UL (ref 0–0.51)
EOSINOPHIL NFR BLD: 4.9 % (ref 0–6.9)
ERYTHROCYTE [DISTWIDTH] IN BLOOD BY AUTOMATED COUNT: 51.5 FL (ref 35.9–50)
FASTING STATUS PATIENT QL REPORTED: NORMAL
GFR SERPLBLD CREATININE-BSD FMLA CKD-EPI: 90 ML/MIN/1.73 M 2
GLOBULIN SER CALC-MCNC: 2.9 G/DL (ref 1.9–3.5)
GLUCOSE SERPL-MCNC: 94 MG/DL (ref 65–99)
HCT VFR BLD AUTO: 45.9 % (ref 37–47)
HDLC SERPL-MCNC: 62 MG/DL
HGB BLD-MCNC: 15 G/DL (ref 12–16)
IMM GRANULOCYTES # BLD AUTO: 0.03 K/UL (ref 0–0.11)
IMM GRANULOCYTES NFR BLD AUTO: 0.3 % (ref 0–0.9)
LDLC SERPL CALC-MCNC: 78 MG/DL
LYMPHOCYTES # BLD AUTO: 3.45 K/UL (ref 1–4.8)
LYMPHOCYTES NFR BLD: 39.2 % (ref 22–41)
MCH RBC QN AUTO: 30.4 PG (ref 27–33)
MCHC RBC AUTO-ENTMCNC: 32.7 G/DL (ref 33.6–35)
MCV RBC AUTO: 92.9 FL (ref 81.4–97.8)
MONOCYTES # BLD AUTO: 0.55 K/UL (ref 0–0.85)
MONOCYTES NFR BLD AUTO: 6.2 % (ref 0–13.4)
NEUTROPHILS # BLD AUTO: 4.27 K/UL (ref 2–7.15)
NEUTROPHILS NFR BLD: 48.5 % (ref 44–72)
NRBC # BLD AUTO: 0 K/UL
NRBC BLD-RTO: 0 /100 WBC
PLATELET # BLD AUTO: 289 K/UL (ref 164–446)
PMV BLD AUTO: 10.2 FL (ref 9–12.9)
POTASSIUM SERPL-SCNC: 4.4 MMOL/L (ref 3.6–5.5)
PROT SERPL-MCNC: 7.1 G/DL (ref 6–8.2)
RBC # BLD AUTO: 4.94 M/UL (ref 4.2–5.4)
SODIUM SERPL-SCNC: 138 MMOL/L (ref 135–145)
T3 SERPL-MCNC: 130 NG/DL (ref 60–181)
T4 FREE SERPL-MCNC: 1.33 NG/DL (ref 0.93–1.7)
TRIGL SERPL-MCNC: 95 MG/DL (ref 0–149)
TSH SERPL DL<=0.005 MIU/L-ACNC: 0.19 UIU/ML (ref 0.38–5.33)
WBC # BLD AUTO: 8.8 K/UL (ref 4.8–10.8)

## 2022-07-12 PROCEDURE — 85025 COMPLETE CBC W/AUTO DIFF WBC: CPT

## 2022-07-12 PROCEDURE — 36415 COLL VENOUS BLD VENIPUNCTURE: CPT

## 2022-07-12 PROCEDURE — 80053 COMPREHEN METABOLIC PANEL: CPT

## 2022-07-12 PROCEDURE — 84480 ASSAY TRIIODOTHYRONINE (T3): CPT

## 2022-07-12 PROCEDURE — 84439 ASSAY OF FREE THYROXINE: CPT

## 2022-07-12 PROCEDURE — 80061 LIPID PANEL: CPT

## 2022-07-12 PROCEDURE — 84443 ASSAY THYROID STIM HORMONE: CPT

## 2022-07-18 DIAGNOSIS — M25.561 CHRONIC PAIN OF BOTH KNEES: ICD-10-CM

## 2022-07-18 DIAGNOSIS — G89.29 CHRONIC LOW BACK PAIN, UNSPECIFIED BACK PAIN LATERALITY, UNSPECIFIED WHETHER SCIATICA PRESENT: ICD-10-CM

## 2022-07-18 DIAGNOSIS — G89.29 CHRONIC PAIN OF BOTH KNEES: ICD-10-CM

## 2022-07-18 DIAGNOSIS — M25.562 CHRONIC PAIN OF BOTH KNEES: ICD-10-CM

## 2022-07-18 DIAGNOSIS — M54.50 CHRONIC LOW BACK PAIN, UNSPECIFIED BACK PAIN LATERALITY, UNSPECIFIED WHETHER SCIATICA PRESENT: ICD-10-CM

## 2022-07-18 RX ORDER — HYDROCODONE BITARTRATE AND ACETAMINOPHEN 5; 325 MG/1; MG/1
1 TABLET ORAL EVERY 4 HOURS PRN
Qty: 30 TABLET | Refills: 0 | Status: SHIPPED | OUTPATIENT
Start: 2022-07-18 | End: 2022-08-17

## 2022-10-07 RX ORDER — OMEPRAZOLE 20 MG/1
20 CAPSULE, DELAYED RELEASE ORAL DAILY
Qty: 90 CAPSULE | Refills: 3 | Status: SHIPPED | OUTPATIENT
Start: 2022-10-07 | End: 2023-10-02

## 2022-11-03 ENCOUNTER — PATIENT MESSAGE (OUTPATIENT)
Dept: HEALTH INFORMATION MANAGEMENT | Facility: OTHER | Age: 72
End: 2022-11-03

## 2023-02-06 ENCOUNTER — HOSPITAL ENCOUNTER (OUTPATIENT)
Dept: RADIOLOGY | Facility: MEDICAL CENTER | Age: 73
End: 2023-02-06
Attending: INTERNAL MEDICINE
Payer: MEDICARE

## 2023-02-06 DIAGNOSIS — Z12.31 VISIT FOR SCREENING MAMMOGRAM: ICD-10-CM

## 2023-02-06 PROCEDURE — 77063 BREAST TOMOSYNTHESIS BI: CPT

## 2023-02-07 ENCOUNTER — HOSPITAL ENCOUNTER (OUTPATIENT)
Dept: RADIOLOGY | Facility: MEDICAL CENTER | Age: 73
End: 2023-02-07
Payer: MEDICARE

## 2023-02-08 DIAGNOSIS — F41.9 ANXIETY: ICD-10-CM

## 2023-02-08 RX ORDER — ESCITALOPRAM OXALATE 20 MG/1
TABLET ORAL
Qty: 90 TABLET | Refills: 0 | Status: SHIPPED | OUTPATIENT
Start: 2023-02-08 | End: 2023-05-08

## 2023-02-09 DIAGNOSIS — R92.8 ABNORMAL MAMMOGRAM: ICD-10-CM

## 2023-02-16 ENCOUNTER — HOSPITAL ENCOUNTER (OUTPATIENT)
Dept: LAB | Facility: MEDICAL CENTER | Age: 73
End: 2023-02-16
Attending: INTERNAL MEDICINE
Payer: MEDICARE

## 2023-02-16 DIAGNOSIS — Z13.0 SCREENING FOR DEFICIENCY ANEMIA: ICD-10-CM

## 2023-02-16 DIAGNOSIS — E89.0 POSTOPERATIVE HYPOTHYROIDISM: ICD-10-CM

## 2023-02-16 LAB
BASOPHILS # BLD AUTO: 0.9 % (ref 0–1.8)
BASOPHILS # BLD: 0.1 K/UL (ref 0–0.12)
EOSINOPHIL # BLD AUTO: 0.31 K/UL (ref 0–0.51)
EOSINOPHIL NFR BLD: 2.9 % (ref 0–6.9)
ERYTHROCYTE [DISTWIDTH] IN BLOOD BY AUTOMATED COUNT: 47.2 FL (ref 35.9–50)
HCT VFR BLD AUTO: 47 % (ref 37–47)
HGB BLD-MCNC: 15.4 G/DL (ref 12–16)
IMM GRANULOCYTES # BLD AUTO: 0.04 K/UL (ref 0–0.11)
IMM GRANULOCYTES NFR BLD AUTO: 0.4 % (ref 0–0.9)
LYMPHOCYTES # BLD AUTO: 3.64 K/UL (ref 1–4.8)
LYMPHOCYTES NFR BLD: 34.1 % (ref 22–41)
MCH RBC QN AUTO: 31.2 PG (ref 27–33)
MCHC RBC AUTO-ENTMCNC: 32.8 G/DL (ref 33.6–35)
MCV RBC AUTO: 95.1 FL (ref 81.4–97.8)
MONOCYTES # BLD AUTO: 0.53 K/UL (ref 0–0.85)
MONOCYTES NFR BLD AUTO: 5 % (ref 0–13.4)
NEUTROPHILS # BLD AUTO: 6.06 K/UL (ref 2–7.15)
NEUTROPHILS NFR BLD: 56.7 % (ref 44–72)
NRBC # BLD AUTO: 0 K/UL
NRBC BLD-RTO: 0 /100 WBC
PLATELET # BLD AUTO: 299 K/UL (ref 164–446)
PMV BLD AUTO: 10.4 FL (ref 9–12.9)
RBC # BLD AUTO: 4.94 M/UL (ref 4.2–5.4)
T4 FREE SERPL-MCNC: 1.29 NG/DL (ref 0.93–1.7)
TSH SERPL DL<=0.005 MIU/L-ACNC: 0.57 UIU/ML (ref 0.38–5.33)
WBC # BLD AUTO: 10.7 K/UL (ref 4.8–10.8)

## 2023-02-16 PROCEDURE — 85025 COMPLETE CBC W/AUTO DIFF WBC: CPT

## 2023-02-16 PROCEDURE — 84443 ASSAY THYROID STIM HORMONE: CPT

## 2023-02-16 PROCEDURE — 84439 ASSAY OF FREE THYROXINE: CPT

## 2023-02-16 PROCEDURE — 36415 COLL VENOUS BLD VENIPUNCTURE: CPT

## 2023-02-17 ENCOUNTER — HOSPITAL ENCOUNTER (OUTPATIENT)
Dept: RADIOLOGY | Facility: MEDICAL CENTER | Age: 73
End: 2023-02-17
Attending: PHYSICIAN ASSISTANT
Payer: MEDICARE

## 2023-02-17 DIAGNOSIS — F41.9 ANXIETY: ICD-10-CM

## 2023-02-17 DIAGNOSIS — R92.8 ABNORMAL MAMMOGRAM: ICD-10-CM

## 2023-02-17 PROCEDURE — 76642 ULTRASOUND BREAST LIMITED: CPT | Mod: LT

## 2023-02-17 PROCEDURE — G0279 TOMOSYNTHESIS, MAMMO: HCPCS

## 2023-03-08 NOTE — PROGRESS NOTES
Subjective:     CC:   Chief Complaint   Patient presents with    Medication Refill     Pain medication for Oxycodone - hurt back while shoveling snow.          HPI:   Sola presents today for an acute medical visit. The patient has chronic lower back pain for which she takes Norco and cyclobenzaprine on an as-needed basis.  She states she developed an acute worsening of her lower back pain while shoveling snow approximately 2 weeks ago.  She reports the pain is overall improving and no red flag symptoms are present, however she needs a refill of her Norco.  We also reviewed her recent lab results regarding her thyroid and cholesterol, both were at goal and she will continue on her current medication doses.  She continues to take the S-Citalopram for depression and anxiety and feels it works well for her.       History reviewed. No pertinent past medical history.    Social History     Tobacco Use    Smoking status: Light Smoker    Smokeless tobacco: Never    Tobacco comments:     1 per day   Vaping Use    Vaping Use: Never used   Substance Use Topics    Alcohol use: Yes     Comment: seldom    Drug use: Never       Current Outpatient Medications Ordered in Epic   Medication Sig Dispense Refill    escitalopram (LEXAPRO) 20 MG tablet Take 1 tablet by mouth once daily 90 Tablet 0    rosuvastatin (CRESTOR) 5 MG Tab TAKE 1 TABLET BY MOUTH ONCE DAILY IN THE EVENING 90 Tablet 3    omeprazole (PRILOSEC) 20 MG delayed-release capsule Take 1 Capsule by mouth every day. 90 Capsule 3    levothyroxine (SYNTHROID) 100 MCG Tab Take 1 Tablet by mouth every morning on an empty stomach. 90 Tablet 3    liothyronine (CYTOMEL) 5 MCG Tab Take 1 Tablet by mouth every day. 90 Tablet 3    tretinoin (RETIN-A) 0.025 % cream APPLY CREAM TOPICALLY TO AFFECTED AREA ONCE DAILY IN THE EVENING      predniSONE (DELTASONE) 20 MG Tab TAKE 1 TO 2 TABLETS BY MOUTH AS DIRECTED      budesonide-formoterol (SYMBICORT) 160-4.5 MCG/ACT Aerosol Inhale 2 Puffs 2  "times a day.      cyclobenzaprine (FLEXERIL) 10 mg Tab Take 10 mg by mouth 3 times a day as needed.      ciprofloxacin (CILOXIN) 0.3 % Solution Administer 1 Drop into both eyes 2 times a day as needed for Other. for recurrent corneal ulceration      Omega-3 Fatty Acids (FISH OIL) 1200 MG Cap Take 2,400 mg by mouth every day.      Multiple Vitamins-Minerals (CENTRUM WOMEN) Tab Take 1 Tablet by mouth every day.      Cholecalciferol (VITAMIN D) 2000 UNIT Tab Take 2,000 Units by mouth every day.      Zinc 50 MG Cap Take 50 mg by mouth every day.      Calcium Carb-Cholecalciferol 600-800 MG-UNIT Tab Take 1 Tablet by mouth every day.       No current [facility]-ordered facility-administered medications on file.       Allergies:  Patient has no known allergies.    Health Maintenance: Completed    Review of Systems:  No fevers or chills. No cough, chest pain, or shortness of breath.       Objective:       Exam:  BP 90/74 (BP Location: Left arm, Patient Position: Sitting, BP Cuff Size: Adult)   Pulse 79   Temp 37.2 °C (99 °F) (Temporal)   Ht 1.727 m (5' 8\")   Wt 93.3 kg (205 lb 9.6 oz)   SpO2 90%   BMI 31.26 kg/m²  Body mass index is 31.26 kg/m².    Gen: Alert and oriented, No apparent distress.  Lungs: Normal effort, CTA bilaterally, no wheezes, rhonchi, or rales  CV: Regular rate and rhythm. No murmurs, rubs, or gallops.  Back: Full range of motion with pain, no spinal point tenderness  Ext: No clubbing, cyanosis, edema.        Assessment & Plan:     72 y.o. female with the following -     Chronic low back pain, unspecified back pain laterality, unspecified whether sciatica present  Chronic medical condition with an acute exacerbation.  The patient has chronic lower back pain for which she takes Norco and cyclobenzaprine on an as-needed basis.  She states she developed an acute worsening of her lower back pain while shoveling snow approximately 2 weeks ago.  She reports the pain is overall improving and no red flag " symptoms are present, however she needs a refill of her Norco.  She signed an updated substance treatment agreement at today's visit.  She will complete the urine drug screen.  I have advised the patient that I will send in the prescription as soon as I receive the UDS results.  Review of the patient's  shows that she was last given a prescription for Norco 5-325 mg, dispo # 30, on 7/18/2022. Obtained and reviewed patient utilization report from [facility] [place] pharmacy database on 3/10/2023 1:28 AM  prior to writing prescription for controlled substance II, III or IV per [place] bill . Based on assessment of the report, the prescription is medically necessary.   -Continue Norco 5-325 mg and cyclobenzaprine 10 mg on an as-needed basis  - Controlled Substance Treatment Agreement signed and scanned into the patient's chart on 3/10/2023  -Urine drug screen ordered on 3/10/2023     Postoperative hypothyroidism  Chronic medical condition.  Ongoing and well-controlled.  The patient currently takes levothyroxine 100 mcg daily and liothyronine 5 mcg daily.  She is clinically euthyroid.  Labs from 2/16/2023 showed a normal TSH of 0.57 and a normal free T4 of 1.29.   -Continue current regimen of levothyroxine 100 mcg daily and liothyronine 5 mcg daily     Mixed hyperlipidemia  Chronic medical condition.  Ongoing and well-controlled.  The patient is on rosuvastatin 5 mg nightly.  She denies statin associated myalgias.  Lipid panel on 7/12/2022 showed a total cholesterol 159, LDL 78, HDL 62, triglycerides 95.  -Continue current regimen of rosuvastatin 5 mg nightly    Mild episode of recurrent major depressive disorder (HCC)  DIMITRI (generalized anxiety disorder)  Chronic medical condition.  Ongoing and well-controlled.  The patient takes escitalopram 20 mg daily which she has been on for a number of years.  She reports the medication effectively controls her depression and anxiety.  She does not feel a medication adjustment is  needed at this time.  No thoughts of self-harm.     -Continue escitalopram 20 mg daily    Return in about 6 months (around 9/9/2023) for 6-month f/u visit.    Please note that this dictation was created using voice recognition software. I have made every reasonable attempt to correct obvious errors, but I expect that there are errors of grammar and possibly content that I did not discover before finalizing the note.

## 2023-03-09 ENCOUNTER — OFFICE VISIT (OUTPATIENT)
Dept: MEDICAL GROUP | Facility: PHYSICIAN GROUP | Age: 73
End: 2023-03-09
Payer: MEDICARE

## 2023-03-09 VITALS
WEIGHT: 205.6 LBS | BODY MASS INDEX: 31.16 KG/M2 | HEIGHT: 68 IN | SYSTOLIC BLOOD PRESSURE: 90 MMHG | OXYGEN SATURATION: 90 % | HEART RATE: 79 BPM | TEMPERATURE: 99 F | DIASTOLIC BLOOD PRESSURE: 74 MMHG

## 2023-03-09 DIAGNOSIS — E78.2 MIXED HYPERLIPIDEMIA: ICD-10-CM

## 2023-03-09 DIAGNOSIS — E89.0 POSTOPERATIVE HYPOTHYROIDISM: ICD-10-CM

## 2023-03-09 DIAGNOSIS — M54.50 CHRONIC LOW BACK PAIN, UNSPECIFIED BACK PAIN LATERALITY, UNSPECIFIED WHETHER SCIATICA PRESENT: ICD-10-CM

## 2023-03-09 DIAGNOSIS — F41.1 GAD (GENERALIZED ANXIETY DISORDER): ICD-10-CM

## 2023-03-09 DIAGNOSIS — G89.29 CHRONIC LOW BACK PAIN, UNSPECIFIED BACK PAIN LATERALITY, UNSPECIFIED WHETHER SCIATICA PRESENT: ICD-10-CM

## 2023-03-09 DIAGNOSIS — F33.0 MILD EPISODE OF RECURRENT MAJOR DEPRESSIVE DISORDER (HCC): ICD-10-CM

## 2023-03-09 PROCEDURE — 99214 OFFICE O/P EST MOD 30 MIN: CPT | Performed by: INTERNAL MEDICINE

## 2023-03-09 ASSESSMENT — PATIENT HEALTH QUESTIONNAIRE - PHQ9: CLINICAL INTERPRETATION OF PHQ2 SCORE: 0

## 2023-03-09 ASSESSMENT — FIBROSIS 4 INDEX: FIB4 SCORE: 0.56

## 2023-03-10 ENCOUNTER — HOSPITAL ENCOUNTER (OUTPATIENT)
Facility: MEDICAL CENTER | Age: 73
End: 2023-03-10
Attending: INTERNAL MEDICINE
Payer: MEDICARE

## 2023-03-10 DIAGNOSIS — G89.29 CHRONIC LOW BACK PAIN, UNSPECIFIED BACK PAIN LATERALITY, UNSPECIFIED WHETHER SCIATICA PRESENT: ICD-10-CM

## 2023-03-10 DIAGNOSIS — M54.50 CHRONIC LOW BACK PAIN, UNSPECIFIED BACK PAIN LATERALITY, UNSPECIFIED WHETHER SCIATICA PRESENT: ICD-10-CM

## 2023-03-10 PROCEDURE — G0481 DRUG TEST DEF 8-14 CLASSES: HCPCS

## 2023-03-15 DIAGNOSIS — M54.50 CHRONIC LOW BACK PAIN, UNSPECIFIED BACK PAIN LATERALITY, UNSPECIFIED WHETHER SCIATICA PRESENT: ICD-10-CM

## 2023-03-15 DIAGNOSIS — G89.29 CHRONIC LOW BACK PAIN, UNSPECIFIED BACK PAIN LATERALITY, UNSPECIFIED WHETHER SCIATICA PRESENT: ICD-10-CM

## 2023-03-15 LAB
1OH-MIDAZOLAM UR QL SCN: NOT DETECTED
6MAM UR QL: NOT DETECTED
7AMINOCLONAZEPAM UR QL: NOT DETECTED
A-OH ALPRAZ UR QL: NOT DETECTED
ALPRAZ UR QL: NOT DETECTED
AMPHET UR QL SCN: NOT DETECTED
ANNOTATION COMMENT IMP: NORMAL
ANNOTATION COMMENT IMP: NORMAL
BARBITURATES UR QL: NOT DETECTED
BUPRENORPHINE UR QL: NOT DETECTED
BZE UR QL: NOT DETECTED
CARBOXYTHC UR QL: NOT DETECTED
CARISOPRODOL UR QL: NOT DETECTED
CLONAZEPAM UR QL: NOT DETECTED
CODEINE UR QL: NOT DETECTED
DIAZEPAM UR QL: NOT DETECTED
ETHYL GLUCURONIDE UR QL: NOT DETECTED
FENTANYL UR QL: NOT DETECTED
GABAPENTIN UR QL: NOT DETECTED
HYDROCODONE UR QL: NOT DETECTED
HYDROMORPHONE UR QL: NOT DETECTED
LORAZEPAM UR QL: NOT DETECTED
MDA UR QL: NOT DETECTED
MDEA UR QL: NOT DETECTED
MDMA UR QL: NOT DETECTED
MEPERIDINE UR QL: NOT DETECTED
METHADONE UR QL: NOT DETECTED
METHAMPHET UR QL: NOT DETECTED
MIDAZOLAM UR QL SCN: NOT DETECTED
MORPHINE UR QL: NOT DETECTED
NALOXONE UR QL SCN: NOT DETECTED
NORBUPRENORPHINE UR QL CFM: NOT DETECTED
NORDIAZEPAM UR QL: NOT DETECTED
NORFENTANYL UR QL: NOT DETECTED
NORHYDROCODONE UR QL CFM: NOT DETECTED
NOROXYCODONE UR QL CFM: NOT DETECTED
NOROXYMORPH CO100 Q0458: NOT DETECTED
OXAZEPAM UR QL: NOT DETECTED
OXYCODONE UR QL: NOT DETECTED
OXYMORPHONE UR QL: NOT DETECTED
PATHOLOGY STUDY: NORMAL
PCP UR QL: NOT DETECTED
PHENTERMINE UR QL: NOT DETECTED
PPAA UR QL: NOT DETECTED
PREGABALIN UR QL SCN: NOT DETECTED
SERVICE CMNT-IMP: NORMAL
TAPENADOL OSULF CO200 Q0473: NOT DETECTED
TAPENTADOL UR QL SCN: NOT DETECTED
TEMAZEPAM UR QL: NOT DETECTED
TRAMADOL UR QL: NOT DETECTED
ZOLPIDEM PHENYL-4-CARB UR QL SCN: NOT DETECTED
ZOLPIDEM UR QL: NOT DETECTED

## 2023-03-15 RX ORDER — HYDROCODONE BITARTRATE AND ACETAMINOPHEN 5; 325 MG/1; MG/1
1 TABLET ORAL EVERY 8 HOURS PRN
Qty: 30 TABLET | Refills: 0 | Status: SHIPPED | OUTPATIENT
Start: 2023-03-15 | End: 2023-03-15

## 2023-03-29 RX ORDER — BUDESONIDE AND FORMOTEROL FUMARATE DIHYDRATE 160; 4.5 UG/1; UG/1
2 AEROSOL RESPIRATORY (INHALATION) 2 TIMES DAILY
Qty: 10.2 G | Refills: 3 | Status: SHIPPED | OUTPATIENT
Start: 2023-03-29 | End: 2023-04-11 | Stop reason: SDUPTHER

## 2023-04-11 ENCOUNTER — TELEPHONE (OUTPATIENT)
Dept: MEDICAL GROUP | Facility: PHYSICIAN GROUP | Age: 73
End: 2023-04-11
Payer: MEDICARE

## 2023-04-11 RX ORDER — BUDESONIDE AND FORMOTEROL FUMARATE DIHYDRATE 160; 4.5 UG/1; UG/1
2 AEROSOL RESPIRATORY (INHALATION) 2 TIMES DAILY
Qty: 3 EACH | Refills: 3 | Status: SHIPPED | OUTPATIENT
Start: 2023-04-11 | End: 2024-01-31

## 2023-04-11 NOTE — TELEPHONE ENCOUNTER
Pt states that her Rx for her inhaler was ent over wrong. She stated that she needs it to be for 90 days and get one inhaler per month.   Pt states that it was sent differently

## 2023-04-20 ENCOUNTER — TELEPHONE (OUTPATIENT)
Dept: HEALTH INFORMATION MANAGEMENT | Facility: OTHER | Age: 73
End: 2023-04-20

## 2023-05-06 DIAGNOSIS — F41.9 ANXIETY: ICD-10-CM

## 2023-05-08 RX ORDER — ESCITALOPRAM OXALATE 20 MG/1
TABLET ORAL
Qty: 90 TABLET | Refills: 3 | Status: SHIPPED | OUTPATIENT
Start: 2023-05-08

## 2023-06-28 ENCOUNTER — OFFICE VISIT (OUTPATIENT)
Dept: MEDICAL GROUP | Facility: PHYSICIAN GROUP | Age: 73
End: 2023-06-28
Payer: MEDICARE

## 2023-06-28 VITALS
OXYGEN SATURATION: 95 % | WEIGHT: 198 LBS | BODY MASS INDEX: 30.01 KG/M2 | SYSTOLIC BLOOD PRESSURE: 108 MMHG | HEART RATE: 66 BPM | DIASTOLIC BLOOD PRESSURE: 62 MMHG | TEMPERATURE: 97.8 F | HEIGHT: 68 IN

## 2023-06-28 DIAGNOSIS — M25.561 CHRONIC PAIN OF BOTH KNEES: ICD-10-CM

## 2023-06-28 DIAGNOSIS — G89.29 CHRONIC LOW BACK PAIN, UNSPECIFIED BACK PAIN LATERALITY, UNSPECIFIED WHETHER SCIATICA PRESENT: ICD-10-CM

## 2023-06-28 DIAGNOSIS — E78.2 MIXED HYPERLIPIDEMIA: ICD-10-CM

## 2023-06-28 DIAGNOSIS — E89.0 POSTOPERATIVE HYPOTHYROIDISM: ICD-10-CM

## 2023-06-28 DIAGNOSIS — H66.90 ACUTE OTITIS MEDIA, UNSPECIFIED OTITIS MEDIA TYPE: ICD-10-CM

## 2023-06-28 DIAGNOSIS — Z13.0 SCREENING FOR DEFICIENCY ANEMIA: ICD-10-CM

## 2023-06-28 DIAGNOSIS — Z12.11 COLON CANCER SCREENING: ICD-10-CM

## 2023-06-28 DIAGNOSIS — M25.562 CHRONIC PAIN OF BOTH KNEES: ICD-10-CM

## 2023-06-28 DIAGNOSIS — G89.29 CHRONIC PAIN OF BOTH KNEES: ICD-10-CM

## 2023-06-28 DIAGNOSIS — M54.50 CHRONIC LOW BACK PAIN, UNSPECIFIED BACK PAIN LATERALITY, UNSPECIFIED WHETHER SCIATICA PRESENT: ICD-10-CM

## 2023-06-28 PROCEDURE — 3078F DIAST BP <80 MM HG: CPT | Performed by: INTERNAL MEDICINE

## 2023-06-28 PROCEDURE — 3074F SYST BP LT 130 MM HG: CPT | Performed by: INTERNAL MEDICINE

## 2023-06-28 PROCEDURE — 99214 OFFICE O/P EST MOD 30 MIN: CPT | Performed by: INTERNAL MEDICINE

## 2023-06-28 RX ORDER — AZITHROMYCIN 250 MG/1
TABLET, FILM COATED ORAL
Qty: 6 TABLET | Refills: 0 | Status: SHIPPED | OUTPATIENT
Start: 2023-06-28 | End: 2023-07-26 | Stop reason: SDUPTHER

## 2023-06-28 RX ORDER — HYDROCODONE BITARTRATE AND ACETAMINOPHEN 5; 325 MG/1; MG/1
1 TABLET ORAL EVERY 8 HOURS PRN
Qty: 30 TABLET | Refills: 0 | Status: SHIPPED | OUTPATIENT
Start: 2023-06-28 | End: 2023-10-03

## 2023-06-28 ASSESSMENT — FIBROSIS 4 INDEX: FIB4 SCORE: 0.56

## 2023-06-28 NOTE — PROGRESS NOTES
Subjective:     CC:   Chief Complaint   Patient presents with    Medication Refill     She wants to know if she can get them filled for 60 days instead of 30    Sore Throat     4 days     Earache     Right ear is the only one that is achy          HPI:   Sola presents today for follow-up visit.  The patient needs a refill of her Norco which she takes for chronic lower back and knee pain.  There has been no evidence of dose escalation.  Her controlled substance treatment agreement and pain management screen are up-to-date.  The patient also reports a 4-day history of right ear and throat pain.  No known sick contacts.  No fevers, chills, cough, headache, rhinorrhea, malaise.  Reviewed the patient's most recent thyroid and cholesterol labs.  She will continue on her current regimen.  She is due for updated labs.  She is also due for colon cancer screening.      History reviewed. No pertinent past medical history.    Social History     Tobacco Use    Smoking status: Light Smoker    Smokeless tobacco: Never    Tobacco comments:     1 per day   Vaping Use    Vaping Use: Never used   Substance Use Topics    Alcohol use: Yes     Comment: seldom    Drug use: Never       Current Outpatient Medications Ordered in Epic   Medication Sig Dispense Refill    azithromycin (ZITHROMAX) 250 MG Tab Take two tablets on the first day, then one tablet daily for 4 days. 6 Tablet 0    HYDROcodone-acetaminophen (NORCO) 5-325 MG Tab per tablet Take 1 Tablet by mouth every 8 hours as needed (pain) for up to 30 days. 30 Tablet 0    escitalopram (LEXAPRO) 20 MG tablet Take 1 tablet by mouth once daily 90 Tablet 3    liothyronine (CYTOMEL) 5 MCG Tab Take 1 tablet by mouth once daily 90 Tablet 3    levothyroxine (SYNTHROID) 100 MCG Tab TAKE 1 TABLET BY MOUTH IN THE MORNING ON AN EMPTY STOMACH 90 Tablet 3    budesonide-formoterol (SYMBICORT) 160-4.5 MCG/ACT Aerosol Inhale 2 Puffs 2 times a day. 3 Each 3    rosuvastatin (CRESTOR) 5 MG Tab TAKE 1  "TABLET BY MOUTH ONCE DAILY IN THE EVENING 90 Tablet 3    omeprazole (PRILOSEC) 20 MG delayed-release capsule Take 1 Capsule by mouth every day. 90 Capsule 3    tretinoin (RETIN-A) 0.025 % cream APPLY CREAM TOPICALLY TO AFFECTED AREA ONCE DAILY IN THE EVENING      predniSONE (DELTASONE) 20 MG Tab TAKE 1 TO 2 TABLETS BY MOUTH AS DIRECTED      cyclobenzaprine (FLEXERIL) 10 mg Tab Take 10 mg by mouth 3 times a day as needed.      ciprofloxacin (CILOXIN) 0.3 % Solution Administer 1 Drop into both eyes 2 times a day as needed for Other. for recurrent corneal ulceration      Omega-3 Fatty Acids (FISH OIL) 1200 MG Cap Take 2,400 mg by mouth every day.      Multiple Vitamins-Minerals (CENTRUM WOMEN) Tab Take 1 Tablet by mouth every day.      Cholecalciferol (VITAMIN D) 2000 UNIT Tab Take 2,000 Units by mouth every day.      Zinc 50 MG Cap Take 50 mg by mouth every day.      Calcium Carb-Cholecalciferol 600-800 MG-UNIT Tab Take 1 Tablet by mouth every day.       No current [facility]-ordered facility-administered medications on file.       Allergies:  Patient has no known allergies.    Health Maintenance: Completed    Review of Systems:  No fevers or chills. No cough, chest pain, or shortness of breath.       Objective:       Exam:  /62   Pulse 66   Temp 36.6 °C (97.8 °F) (Temporal)   Ht 1.727 m (5' 8\")   Wt 89.8 kg (198 lb)   SpO2 95%   BMI 30.11 kg/m²  Body mass index is 30.11 kg/m².    Gen: Alert and oriented, No apparent distress.  Lungs: Normal effort, CTA bilaterally, no wheezes, rhonchi, or rales  CV: Regular rate and rhythm. No murmurs, rubs, or gallops.  Ext: No clubbing, cyanosis, edema.        Assessment & Plan:     72 y.o. female with the following -     Chronic low back pain, unspecified back pain laterality, unspecified whether sciatica present  Chronic pain of both knees  Chronic conditions, controlled.  The patient has chronic lower back pain for which she takes Norco and cyclobenzaprine on an as-needed " basis.  The patient's symptoms are well controlled on her current regimen.  There has been no evidence of dose escalation.  Review of the patient's  shows that she was last given a prescription for Norco 5-325 mg, dispo # 30, on 3/15/2023. Obtained and reviewed patient utilization report from [facility] pharmacy database on 6/28/2023 1:48 PM  prior to writing prescription for controlled substance II, III or IV per [place] bill . Based on assessment of the report, the prescription is medically necessary.   -Continue Norco 5-325 mg and cyclobenzaprine 10 mg on an as-needed basis  - Controlled Substance Treatment Agreement signed and scanned into the patient's chart on 3/10/2023  - Urine drug screen up-to-date on 3/10/2023   - HYDROcodone-acetaminophen (NORCO) 5-325 MG Tab per tablet; Take 1 Tablet by mouth every 8 hours as needed (pain) for up to 30 days.  Dispense: 30 Tablet; Refill: 0    Acute otitis media, unspecified otitis media type  Acute condition.  The patient reports a 4-day history of right ear and throat pain.  No known sick contacts.  No fevers, chills, cough, headache, rhinorrhea, malaise.  -Start azithromycin given duration of symptoms  - azithromycin (ZITHROMAX) 250 MG Tab; Take two tablets on the first day, then one tablet daily for 4 days.  Dispense: 6 Tablet; Refill: 0    Postoperative hypothyroidism  Chronic, controlled.  The patient currently takes levothyroxine 100 mcg daily and liothyronine 5 mcg daily.  Labs from 2/16/2023 showed a normal TSH of 0.57 and a normal free T4 of 1.29.  She is clinically euthyroid.  The patient is due for updated labs.  -Continue current regimen of levothyroxine 100 mcg daily and liothyronine 5 mcg daily  - TSH; Future  - FREE THYROXINE; Future  - T3 FREE; Future    Mixed hyperlipidemia  Chronic, controlled.  The patient currently takes rosuvastatin 5 mg nightly.  Most recent lipid panel on 7/12/2022 showed a total cholesterol 159, LDL 78, HDL 62,  triglycerides 95.  She denies statin associated myalgias.  The patient is due for updated labs.  -Continue current regimen of rosuvastatin 5 mg nightly  - Lipid Profile; Future  - Comp Metabolic Panel; Future    Screening for deficiency anemia  - CBC WITH DIFFERENTIAL; Future    Colon cancer screening  - Referral to GI for Colonoscopy       Return in about 6 months (around 12/28/2023) for 6-month f/u visit.    Please note that this dictation was created using voice recognition software. I have made every reasonable attempt to correct obvious errors, but I expect that there are errors of grammar and possibly content that I did not discover before finalizing the note.

## 2023-07-19 ENCOUNTER — TELEPHONE (OUTPATIENT)
Dept: MEDICAL GROUP | Facility: PHYSICIAN GROUP | Age: 73
End: 2023-07-19
Payer: MEDICARE

## 2023-07-19 NOTE — TELEPHONE ENCOUNTER
Patient wants a referral to vascular medicine. She is experiencing painful leg veins patient stated over a voicemail.

## 2023-07-20 DIAGNOSIS — I83.813 VARICOSE VEINS OF BILATERAL LOWER EXTREMITIES WITH PAIN: ICD-10-CM

## 2023-07-26 ENCOUNTER — OFFICE VISIT (OUTPATIENT)
Dept: MEDICAL GROUP | Facility: PHYSICIAN GROUP | Age: 73
End: 2023-07-26
Payer: MEDICARE

## 2023-07-26 VITALS
OXYGEN SATURATION: 94 % | BODY MASS INDEX: 29.58 KG/M2 | SYSTOLIC BLOOD PRESSURE: 106 MMHG | DIASTOLIC BLOOD PRESSURE: 64 MMHG | HEIGHT: 68 IN | WEIGHT: 195.2 LBS | RESPIRATION RATE: 18 BRPM | HEART RATE: 93 BPM | TEMPERATURE: 97.9 F

## 2023-07-26 DIAGNOSIS — R05.1 ACUTE COUGH: ICD-10-CM

## 2023-07-26 LAB
FLUAV RNA SPEC QL NAA+PROBE: NEGATIVE
FLUBV RNA SPEC QL NAA+PROBE: NEGATIVE
RSV RNA SPEC QL NAA+PROBE: NEGATIVE
SARS-COV-2 RNA RESP QL NAA+PROBE: NEGATIVE

## 2023-07-26 PROCEDURE — 3078F DIAST BP <80 MM HG: CPT | Performed by: FAMILY MEDICINE

## 2023-07-26 PROCEDURE — 3074F SYST BP LT 130 MM HG: CPT | Performed by: FAMILY MEDICINE

## 2023-07-26 PROCEDURE — 0241U POCT CEPHEID COV-2, FLU A/B, RSV - PCR: CPT | Performed by: FAMILY MEDICINE

## 2023-07-26 PROCEDURE — 99213 OFFICE O/P EST LOW 20 MIN: CPT | Performed by: FAMILY MEDICINE

## 2023-07-26 RX ORDER — AZITHROMYCIN 250 MG/1
TABLET, FILM COATED ORAL
Qty: 6 TABLET | Refills: 0 | Status: SHIPPED | OUTPATIENT
Start: 2023-07-26 | End: 2023-10-03

## 2023-07-26 ASSESSMENT — FIBROSIS 4 INDEX: FIB4 SCORE: 0.57

## 2023-08-03 ENCOUNTER — OFFICE VISIT (OUTPATIENT)
Dept: URGENT CARE | Facility: PHYSICIAN GROUP | Age: 73
End: 2023-08-03
Payer: MEDICARE

## 2023-08-03 ENCOUNTER — HOSPITAL ENCOUNTER (OUTPATIENT)
Facility: MEDICAL CENTER | Age: 73
End: 2023-08-03
Attending: PHYSICIAN ASSISTANT
Payer: MEDICARE

## 2023-08-03 VITALS
HEART RATE: 70 BPM | HEIGHT: 68 IN | OXYGEN SATURATION: 96 % | DIASTOLIC BLOOD PRESSURE: 72 MMHG | WEIGHT: 192.9 LBS | TEMPERATURE: 97.2 F | SYSTOLIC BLOOD PRESSURE: 110 MMHG | BODY MASS INDEX: 29.24 KG/M2 | RESPIRATION RATE: 16 BRPM

## 2023-08-03 DIAGNOSIS — N30.01 ACUTE CYSTITIS WITH HEMATURIA: ICD-10-CM

## 2023-08-03 LAB
APPEARANCE UR: NORMAL
BILIRUB UR STRIP-MCNC: NEGATIVE MG/DL
COLOR UR AUTO: YELLOW
GLUCOSE UR STRIP.AUTO-MCNC: NEGATIVE MG/DL
KETONES UR STRIP.AUTO-MCNC: NEGATIVE MG/DL
LEUKOCYTE ESTERASE UR QL STRIP.AUTO: NORMAL
NITRITE UR QL STRIP.AUTO: NEGATIVE
PH UR STRIP.AUTO: 6 [PH] (ref 5–8)
PROT UR QL STRIP: 30 MG/DL
RBC UR QL AUTO: NORMAL
SP GR UR STRIP.AUTO: 1.01
UROBILINOGEN UR STRIP-MCNC: 0.2 MG/DL

## 2023-08-03 PROCEDURE — 3078F DIAST BP <80 MM HG: CPT | Performed by: PHYSICIAN ASSISTANT

## 2023-08-03 PROCEDURE — 81002 URINALYSIS NONAUTO W/O SCOPE: CPT | Performed by: PHYSICIAN ASSISTANT

## 2023-08-03 PROCEDURE — 3074F SYST BP LT 130 MM HG: CPT | Performed by: PHYSICIAN ASSISTANT

## 2023-08-03 PROCEDURE — 87077 CULTURE AEROBIC IDENTIFY: CPT

## 2023-08-03 PROCEDURE — 87086 URINE CULTURE/COLONY COUNT: CPT

## 2023-08-03 PROCEDURE — 99213 OFFICE O/P EST LOW 20 MIN: CPT | Performed by: PHYSICIAN ASSISTANT

## 2023-08-03 PROCEDURE — 87186 SC STD MICRODIL/AGAR DIL: CPT

## 2023-08-03 RX ORDER — SULFAMETHOXAZOLE AND TRIMETHOPRIM 800; 160 MG/1; MG/1
1 TABLET ORAL EVERY 12 HOURS
Qty: 10 TABLET | Refills: 0 | Status: SHIPPED | OUTPATIENT
Start: 2023-08-03 | End: 2023-08-06

## 2023-08-03 ASSESSMENT — ENCOUNTER SYMPTOMS
FLANK PAIN: 0
CHILLS: 0
DIZZINESS: 0
FEVER: 0
VOMITING: 0
NAUSEA: 0
ABDOMINAL PAIN: 1

## 2023-08-03 ASSESSMENT — FIBROSIS 4 INDEX: FIB4 SCORE: 0.57

## 2023-08-03 NOTE — PROGRESS NOTES
"Subjective     Surendra Garcia is a 73 y.o. female who presents with UTI (C/O burning, frequency, painful, bladder fullness, slight amount of blood x 4 days.)      UTI  This is a new problem. The current episode started in the past 7 days (started 4-5 days ago). The problem occurs constantly. The problem has been waxing and waning. Associated symptoms include abdominal pain and urinary symptoms (Pain with urination, urgency/frequency of urination.  Mild blood this morning). Pertinent negatives include no chills, fever, nausea or vomiting. Nothing aggravates the symptoms. Treatments tried: She used an OTC urinary analgesic. The treatment provided mild relief.       Review of Systems   Constitutional:  Negative for chills, fever and malaise/fatigue.   Gastrointestinal:  Positive for abdominal pain. Negative for nausea and vomiting.   Genitourinary:  Positive for dysuria, frequency, hematuria and urgency. Negative for flank pain.   Neurological:  Negative for dizziness.          Objective     /72 (BP Location: Right arm, Patient Position: Sitting, BP Cuff Size: Adult long)   Pulse 70   Temp 36.2 °C (97.2 °F) (Temporal)   Resp 16   Ht 1.727 m (5' 8\")   Wt 87.5 kg (192 lb 14.4 oz)   SpO2 96%   BMI 29.33 kg/m²      Physical Exam  Constitutional:       General: She is not in acute distress.     Appearance: She is not diaphoretic.   HENT:      Head: Normocephalic and atraumatic.      Right Ear: External ear normal.      Left Ear: External ear normal.   Eyes:      Conjunctiva/sclera: Conjunctivae normal.      Pupils: Pupils are equal, round, and reactive to light.   Pulmonary:      Effort: Pulmonary effort is normal. No respiratory distress.   Abdominal:      Tenderness: There is no abdominal tenderness. There is no right CVA tenderness or left CVA tenderness.   Musculoskeletal:      Cervical back: Normal range of motion.   Skin:     Findings: No rash.   Neurological:      Mental Status: She is alert and " oriented to person, place, and time.   Psychiatric:         Mood and Affect: Mood and affect normal.         Cognition and Memory: Memory normal.         Judgment: Judgment normal.       POCT Urinalysis  Lab Results   Component Value Date/Time    POCCOLOR yellow 08/03/2023 01:17 PM    POCAPPEAR cloudy 08/03/2023 01:17 PM    POCLEUKEST large 08/03/2023 01:17 PM    POCNITRITE negative 08/03/2023 01:17 PM    POCUROBILIGE 0.2 08/03/2023 01:17 PM    POCPROTEIN 30 08/03/2023 01:17 PM    POCURPH 6.0 08/03/2023 01:17 PM    POCBLOOD moderate 08/03/2023 01:17 PM    POCSPGRV 1.010 08/03/2023 01:17 PM    POCKETONES negative 08/03/2023 01:17 PM    POCBILIRUBIN negative 08/03/2023 01:17 PM    POCGLUCUA negative 08/03/2023 01:17 PM          Assessment & Plan     1. Acute cystitis with hematuria  - POCT Urinalysis  - Urine Culture; Future  - sulfamethoxazole-trimethoprim (BACTRIM DS) 800-160 MG tablet; Take 1 Tablet by mouth every 12 hours for 5 days.  Dispense: 10 Tablet; Refill: 0             Differential Diagnosis, natural history, and supportive care discussed. Return to the Urgent Care or follow up with your PCP if symptoms fail to resolve, or for any new or worsening symptoms. Emergency room precautions discussed. Patient and/or family appears understanding of information.

## 2023-08-06 DIAGNOSIS — N30.01 ACUTE CYSTITIS WITH HEMATURIA: ICD-10-CM

## 2023-08-06 LAB
BACTERIA UR CULT: ABNORMAL
BACTERIA UR CULT: ABNORMAL
SIGNIFICANT IND 70042: ABNORMAL
SITE SITE: ABNORMAL
SOURCE SOURCE: ABNORMAL

## 2023-08-06 RX ORDER — CEPHALEXIN 500 MG/1
500 CAPSULE ORAL 2 TIMES DAILY
Qty: 14 CAPSULE | Refills: 0 | Status: SHIPPED | OUTPATIENT
Start: 2023-08-06 | End: 2023-08-13

## 2023-08-07 ENCOUNTER — HOSPITAL ENCOUNTER (OUTPATIENT)
Dept: LAB | Facility: MEDICAL CENTER | Age: 73
End: 2023-08-07
Attending: INTERNAL MEDICINE
Payer: MEDICARE

## 2023-08-07 DIAGNOSIS — E78.2 MIXED HYPERLIPIDEMIA: ICD-10-CM

## 2023-08-07 DIAGNOSIS — Z13.0 SCREENING FOR DEFICIENCY ANEMIA: ICD-10-CM

## 2023-08-07 DIAGNOSIS — E89.0 POSTOPERATIVE HYPOTHYROIDISM: ICD-10-CM

## 2023-08-07 LAB
ALBUMIN SERPL BCP-MCNC: 4.2 G/DL (ref 3.2–4.9)
ALBUMIN/GLOB SERPL: 1.3 G/DL
ALP SERPL-CCNC: 81 U/L (ref 30–99)
ALT SERPL-CCNC: 17 U/L (ref 2–50)
ANION GAP SERPL CALC-SCNC: 14 MMOL/L (ref 7–16)
AST SERPL-CCNC: 22 U/L (ref 12–45)
BASOPHILS # BLD AUTO: 1 % (ref 0–1.8)
BASOPHILS # BLD: 0.11 K/UL (ref 0–0.12)
BILIRUB SERPL-MCNC: 0.5 MG/DL (ref 0.1–1.5)
BUN SERPL-MCNC: 9 MG/DL (ref 8–22)
CALCIUM ALBUM COR SERPL-MCNC: 9.5 MG/DL (ref 8.5–10.5)
CALCIUM SERPL-MCNC: 9.7 MG/DL (ref 8.5–10.5)
CHLORIDE SERPL-SCNC: 105 MMOL/L (ref 96–112)
CHOLEST SERPL-MCNC: 134 MG/DL (ref 100–199)
CO2 SERPL-SCNC: 21 MMOL/L (ref 20–33)
CREAT SERPL-MCNC: 1.01 MG/DL (ref 0.5–1.4)
EOSINOPHIL # BLD AUTO: 0.42 K/UL (ref 0–0.51)
EOSINOPHIL NFR BLD: 4 % (ref 0–6.9)
ERYTHROCYTE [DISTWIDTH] IN BLOOD BY AUTOMATED COUNT: 48.1 FL (ref 35.9–50)
FASTING STATUS PATIENT QL REPORTED: NORMAL
GFR SERPLBLD CREATININE-BSD FMLA CKD-EPI: 59 ML/MIN/1.73 M 2
GLOBULIN SER CALC-MCNC: 3.2 G/DL (ref 1.9–3.5)
GLUCOSE SERPL-MCNC: 113 MG/DL (ref 65–99)
HCT VFR BLD AUTO: 46.9 % (ref 37–47)
HDLC SERPL-MCNC: 50 MG/DL
HGB BLD-MCNC: 15.1 G/DL (ref 12–16)
IMM GRANULOCYTES # BLD AUTO: 0.04 K/UL (ref 0–0.11)
IMM GRANULOCYTES NFR BLD AUTO: 0.4 % (ref 0–0.9)
LDLC SERPL CALC-MCNC: 61 MG/DL
LYMPHOCYTES # BLD AUTO: 2.4 K/UL (ref 1–4.8)
LYMPHOCYTES NFR BLD: 22.8 % (ref 22–41)
MCH RBC QN AUTO: 31 PG (ref 27–33)
MCHC RBC AUTO-ENTMCNC: 32.2 G/DL (ref 32.2–35.5)
MCV RBC AUTO: 96.3 FL (ref 81.4–97.8)
MONOCYTES # BLD AUTO: 0.69 K/UL (ref 0–0.85)
MONOCYTES NFR BLD AUTO: 6.6 % (ref 0–13.4)
NEUTROPHILS # BLD AUTO: 6.87 K/UL (ref 1.82–7.42)
NEUTROPHILS NFR BLD: 65.2 % (ref 44–72)
NRBC # BLD AUTO: 0 K/UL
NRBC BLD-RTO: 0 /100 WBC (ref 0–0.2)
PLATELET # BLD AUTO: 322 K/UL (ref 164–446)
PMV BLD AUTO: 10.8 FL (ref 9–12.9)
POTASSIUM SERPL-SCNC: 4.6 MMOL/L (ref 3.6–5.5)
PROT SERPL-MCNC: 7.4 G/DL (ref 6–8.2)
RBC # BLD AUTO: 4.87 M/UL (ref 4.2–5.4)
SODIUM SERPL-SCNC: 140 MMOL/L (ref 135–145)
T3FREE SERPL-MCNC: 3.02 PG/ML (ref 2–4.4)
T4 FREE SERPL-MCNC: 1.23 NG/DL (ref 0.93–1.7)
TRIGL SERPL-MCNC: 115 MG/DL (ref 0–149)
TSH SERPL DL<=0.005 MIU/L-ACNC: 0.78 UIU/ML (ref 0.38–5.33)
WBC # BLD AUTO: 10.5 K/UL (ref 4.8–10.8)

## 2023-08-07 PROCEDURE — 85025 COMPLETE CBC W/AUTO DIFF WBC: CPT

## 2023-08-07 PROCEDURE — 80053 COMPREHEN METABOLIC PANEL: CPT

## 2023-08-07 PROCEDURE — 84439 ASSAY OF FREE THYROXINE: CPT

## 2023-08-07 PROCEDURE — 36415 COLL VENOUS BLD VENIPUNCTURE: CPT

## 2023-08-07 PROCEDURE — 84443 ASSAY THYROID STIM HORMONE: CPT

## 2023-08-07 PROCEDURE — 84481 FREE ASSAY (FT-3): CPT

## 2023-08-07 PROCEDURE — 80061 LIPID PANEL: CPT

## 2023-09-12 ENCOUNTER — HOSPITAL ENCOUNTER (OUTPATIENT)
Dept: RADIOLOGY | Facility: MEDICAL CENTER | Age: 73
End: 2023-09-12
Attending: PHYSICIAN ASSISTANT
Payer: MEDICARE

## 2023-09-12 DIAGNOSIS — Z12.31 ENCOUNTER FOR SCREENING MAMMOGRAM FOR MALIGNANT NEOPLASM OF BREAST: ICD-10-CM

## 2023-09-12 DIAGNOSIS — R92.8 ABNORMAL MAMMOGRAM: ICD-10-CM

## 2023-09-12 PROCEDURE — G0279 TOMOSYNTHESIS, MAMMO: HCPCS

## 2023-09-25 NOTE — PROGRESS NOTES
Subjective:     CC: No chief complaint on file.        HPI:   Sola presents today for follow-up visit and to discuss the following issues:        No past medical history on file.    Social History     Tobacco Use    Smoking status: Every Day     Types: Cigarettes    Smokeless tobacco: Never    Tobacco comments:     3 Cigarettes per day   Vaping Use    Vaping Use: Never used   Substance Use Topics    Alcohol use: Yes     Comment: seldom    Drug use: Never       Current Outpatient Medications Ordered in Epic   Medication Sig Dispense Refill    azithromycin (ZITHROMAX) 250 MG Tab 2 tablets today 1 each day for the next 4 more days (Patient not taking: Reported on 8/3/2023) 6 Tablet 0    escitalopram (LEXAPRO) 20 MG tablet Take 1 tablet by mouth once daily 90 Tablet 3    liothyronine (CYTOMEL) 5 MCG Tab Take 1 tablet by mouth once daily 90 Tablet 3    levothyroxine (SYNTHROID) 100 MCG Tab TAKE 1 TABLET BY MOUTH IN THE MORNING ON AN EMPTY STOMACH 90 Tablet 3    budesonide-formoterol (SYMBICORT) 160-4.5 MCG/ACT Aerosol Inhale 2 Puffs 2 times a day. 3 Each 3    rosuvastatin (CRESTOR) 5 MG Tab TAKE 1 TABLET BY MOUTH ONCE DAILY IN THE EVENING 90 Tablet 3    omeprazole (PRILOSEC) 20 MG delayed-release capsule Take 1 Capsule by mouth every day. 90 Capsule 3    tretinoin (RETIN-A) 0.025 % cream APPLY CREAM TOPICALLY TO AFFECTED AREA ONCE DAILY IN THE EVENING (Patient not taking: Reported on 8/3/2023)      predniSONE (DELTASONE) 20 MG Tab TAKE 1 TO 2 TABLETS BY MOUTH AS DIRECTED      cyclobenzaprine (FLEXERIL) 10 mg Tab Take 10 mg by mouth 3 times a day as needed.      ciprofloxacin (CILOXIN) 0.3 % Solution Administer 1 Drop into both eyes 2 times a day as needed for Other. for recurrent corneal ulceration      Omega-3 Fatty Acids (FISH OIL) 1200 MG Cap Take 2,400 mg by mouth every day.      Multiple Vitamins-Minerals (CENTRUM WOMEN) Tab Take 1 Tablet by mouth every day.      Cholecalciferol (VITAMIN D) 2000 UNIT Tab Take  2,000 Units by mouth every day.      Zinc 50 MG Cap Take 50 mg by mouth every day.      Calcium Carb-Cholecalciferol 600-800 MG-UNIT Tab Take 1 Tablet by mouth every day.       No current [facility]-ordered facility-administered medications on file.       Allergies:  Patient has no known allergies.    Health Maintenance: Completed    Review of Systems:  No fevers or chills. No cough, chest pain, or shortness of breath.       Objective:       Exam:  There were no vitals taken for this visit. There is no height or weight on file to calculate BMI.    Gen: Alert and oriented, No apparent distress.  Lungs: Normal effort, CTA bilaterally, no wheezes, rhonchi, or rales  CV: Regular rate and rhythm. No murmurs, rubs, or gallops.  Ext: No clubbing, cyanosis, edema.        Assessment & Plan:     73 y.o. female with the following -           No follow-ups on file.    Please note that this dictation was created using voice recognition software. I have made every reasonable attempt to correct obvious errors, but I expect that there are errors of grammar and possibly content that I did not discover before finalizing the note.

## 2023-09-28 ENCOUNTER — APPOINTMENT (OUTPATIENT)
Dept: MEDICAL GROUP | Facility: PHYSICIAN GROUP | Age: 73
End: 2023-09-28
Payer: MEDICARE

## 2023-10-03 ENCOUNTER — OFFICE VISIT (OUTPATIENT)
Dept: MEDICAL GROUP | Facility: PHYSICIAN GROUP | Age: 73
End: 2023-10-03
Payer: MEDICARE

## 2023-10-03 VITALS
HEIGHT: 68 IN | BODY MASS INDEX: 28.72 KG/M2 | RESPIRATION RATE: 20 BRPM | OXYGEN SATURATION: 99 % | SYSTOLIC BLOOD PRESSURE: 106 MMHG | DIASTOLIC BLOOD PRESSURE: 74 MMHG | HEART RATE: 96 BPM | TEMPERATURE: 97.3 F | WEIGHT: 189.5 LBS

## 2023-10-03 DIAGNOSIS — J45.20 MILD INTERMITTENT ASTHMA WITHOUT COMPLICATION: ICD-10-CM

## 2023-10-03 DIAGNOSIS — G89.29 CHRONIC LOW BACK PAIN, UNSPECIFIED BACK PAIN LATERALITY, UNSPECIFIED WHETHER SCIATICA PRESENT: ICD-10-CM

## 2023-10-03 DIAGNOSIS — E78.2 MIXED HYPERLIPIDEMIA: ICD-10-CM

## 2023-10-03 DIAGNOSIS — M54.50 CHRONIC LOW BACK PAIN, UNSPECIFIED BACK PAIN LATERALITY, UNSPECIFIED WHETHER SCIATICA PRESENT: ICD-10-CM

## 2023-10-03 DIAGNOSIS — F33.0 MILD EPISODE OF RECURRENT MAJOR DEPRESSIVE DISORDER (HCC): ICD-10-CM

## 2023-10-03 DIAGNOSIS — F17.210 CIGARETTE NICOTINE DEPENDENCE WITHOUT COMPLICATION: ICD-10-CM

## 2023-10-03 DIAGNOSIS — E89.0 POSTOPERATIVE HYPOTHYROIDISM: ICD-10-CM

## 2023-10-03 PROBLEM — F17.200 NICOTINE DEPENDENCE: Status: ACTIVE | Noted: 2023-10-03

## 2023-10-03 PROCEDURE — 3078F DIAST BP <80 MM HG: CPT | Performed by: INTERNAL MEDICINE

## 2023-10-03 PROCEDURE — 99214 OFFICE O/P EST MOD 30 MIN: CPT | Mod: 25 | Performed by: INTERNAL MEDICINE

## 2023-10-03 PROCEDURE — 99406 BEHAV CHNG SMOKING 3-10 MIN: CPT | Performed by: INTERNAL MEDICINE

## 2023-10-03 PROCEDURE — 3074F SYST BP LT 130 MM HG: CPT | Performed by: INTERNAL MEDICINE

## 2023-10-03 RX ORDER — HYDROCODONE BITARTRATE AND ACETAMINOPHEN 5; 325 MG/1; MG/1
1 TABLET ORAL
Qty: 30 TABLET | Refills: 0 | Status: SHIPPED | OUTPATIENT
Start: 2023-10-03 | End: 2023-11-02

## 2023-10-03 ASSESSMENT — FIBROSIS 4 INDEX: FIB4 SCORE: 1.18

## 2023-10-04 ENCOUNTER — APPOINTMENT (OUTPATIENT)
Dept: LAB | Facility: MEDICAL CENTER | Age: 73
End: 2023-10-04
Payer: MEDICARE

## 2023-10-04 ENCOUNTER — HOSPITAL ENCOUNTER (OUTPATIENT)
Facility: MEDICAL CENTER | Age: 73
End: 2023-10-04
Attending: INTERNAL MEDICINE
Payer: MEDICARE

## 2023-10-04 DIAGNOSIS — G89.29 CHRONIC LOW BACK PAIN, UNSPECIFIED BACK PAIN LATERALITY, UNSPECIFIED WHETHER SCIATICA PRESENT: ICD-10-CM

## 2023-10-04 DIAGNOSIS — M54.50 CHRONIC LOW BACK PAIN, UNSPECIFIED BACK PAIN LATERALITY, UNSPECIFIED WHETHER SCIATICA PRESENT: ICD-10-CM

## 2023-10-04 PROCEDURE — G0481 DRUG TEST DEF 8-14 CLASSES: HCPCS

## 2023-10-04 NOTE — ASSESSMENT & PLAN NOTE
Chronic condition.  Patient is being treated with Symbicort twice daily.  She uses albuterol as needed.  Patient denies shortness of breath wheezing or significant cough.

## 2023-10-04 NOTE — ASSESSMENT & PLAN NOTE
This is a chronic condition.  Patient has been followed by her PCP on regular basis.  Patient takes hydrocodone as needed for pain control.  Patient requests Rx refill.  Patient reported that her pain has been well controlled.  Patient denies fever or chills.  She denies bowel or bladder dysfunction.  She denies perineal paresthesia.  The patient denies unexplained weight loss.    In prescribing controlled substances to this patient, I certify that I have obtained and reviewed the medical history of the patient. I have also made a good kasey effort to obtain applicable records from other providers who have treated the patient.     I have reviewed patient's prescription history as maintained by the [place] Prescription Monitoring Program.      I have assessed the patient’s risk for abuse, dependency, and addiction using the validated Opioid Risk Tool     Given the above, I believe the benefits of controlled substance therapy outweigh the risks. The reasons for prescribing controlled substances include my professional opinion that controlled substances are a reasonable choice for this patient in the event other methods are ineffective inadequately controlling pain. Accordingly, I have discussed the risks and benefits of opioid therapy, treatment plan, and alternative therapies with the patient.     Verbal informed consent was provided. Counseled pt on risks, benefits, and potential side effects of controlled substance treatment including but not limited to sedation, alcohol, driving, urine drug screens, tolerance, addiction, impaired judgement, and the risk of fatal overdose if not taken as prescribed; increased risk of overdose/death ; and proper storage and disposal of controlled substances.     Advised pt to take pain med only as needed as prescribed for severe pain /and not to exceed the prescribed amount.  -Explained to pt that goals of treatment plan are to 1)improve function 2)reduce pain level if possible  3)develop self management skills for controlling pain.    -Pt advised to go to nearest ER if any of the following symptoms develop: motor weakness below baseline, respiratory depression, or mental status changes, intolerable side effects, bowel/bladder incontinence, or severe exacerbation of pain.    -UDS ordered     -Consent form signed by pt

## 2023-10-04 NOTE — PROGRESS NOTES
PRIMARY CARE CLINIC VISIT        Chief Complaint   Patient presents with    Medication Refill      Chronic low back pain  Refill Norco  Asthma  Depression  Hyperlipidemia  Nicotine dependence    Pcp  Conchis Vargas M.D.     History of Present Illness     Chronic lower back pain  This is a chronic condition.  Patient has been followed by her PCP on regular basis.  Patient takes hydrocodone as needed for pain control.  Patient requests Rx refill.  Patient reported that her pain has been well controlled.  Patient denies fever or chills.  She denies bowel or bladder dysfunction.  She denies perineal paresthesia.  The patient denies unexplained weight loss.    In prescribing controlled substances to this patient, I certify that I have obtained and reviewed the medical history of the patient. I have also made a good kasey effort to obtain applicable records from other providers who have treated the patient.     I have reviewed patient's prescription history as maintained by the [place] Prescription Monitoring Program.      I have assessed the patient’s risk for abuse, dependency, and addiction using the validated Opioid Risk Tool     Given the above, I believe the benefits of controlled substance therapy outweigh the risks. The reasons for prescribing controlled substances include my professional opinion that controlled substances are a reasonable choice for this patient in the event other methods are ineffective inadequately controlling pain. Accordingly, I have discussed the risks and benefits of opioid therapy, treatment plan, and alternative therapies with the patient.     Verbal informed consent was provided. Counseled pt on risks, benefits, and potential side effects of controlled substance treatment including but not limited to sedation, alcohol, driving, urine drug screens, tolerance, addiction, impaired judgement, and the risk of fatal overdose if not taken as prescribed; increased risk of overdose/death ; and  proper storage and disposal of controlled substances.     Advised pt to take pain med only as needed as prescribed for severe pain /and not to exceed the prescribed amount.  -Explained to pt that goals of treatment plan are to 1)improve function 2)reduce pain level if possible 3)develop self management skills for controlling pain.    -Pt advised to go to nearest ER if any of the following symptoms develop: motor weakness below baseline, respiratory depression, or mental status changes, intolerable side effects, bowel/bladder incontinence, or severe exacerbation of pain.    -UDS ordered     -Consent form signed by pt     Postoperative hypothyroidism  Chronic condition.  The patient presently taking levothyroxine.  Patient tolerating medication well.    MDD (major depressive disorder)  This is a chronic condition.  The patient is being treated with Lexapro 20 Mg daily.  The patient denies SI.    Asthma  Chronic condition.  Patient is being treated with Symbicort twice daily.  She uses albuterol as needed.  Patient denies shortness of breath wheezing or significant cough.    Nicotine dependence  Chronic condition.     Attestation: I spent 5 minutes of face to face counseling pt regarding nicotine cessation.   Discussed w pt and counseling on harmful effects of nicotine.     Strongly advised pt to quit.      Mixed hyperlipidemia  Chronic condition.  The patient currently on diet therapy.  She also take Crestor 5 mg daily.      Current Outpatient Medications on File Prior to Visit   Medication Sig Dispense Refill    omeprazole (PRILOSEC) 20 MG delayed-release capsule Take 1 capsule by mouth once daily 90 Capsule 3    escitalopram (LEXAPRO) 20 MG tablet Take 1 tablet by mouth once daily 90 Tablet 3    liothyronine (CYTOMEL) 5 MCG Tab Take 1 tablet by mouth once daily 90 Tablet 3    levothyroxine (SYNTHROID) 100 MCG Tab TAKE 1 TABLET BY MOUTH IN THE MORNING ON AN EMPTY STOMACH 90 Tablet 3    budesonide-formoterol  (SYMBICORT) 160-4.5 MCG/ACT Aerosol Inhale 2 Puffs 2 times a day. 3 Each 3    rosuvastatin (CRESTOR) 5 MG Tab TAKE 1 TABLET BY MOUTH ONCE DAILY IN THE EVENING 90 Tablet 3    tretinoin (RETIN-A) 0.025 % cream       predniSONE (DELTASONE) 20 MG Tab TAKE 1 TO 2 TABLETS BY MOUTH AS DIRECTED      cyclobenzaprine (FLEXERIL) 10 mg Tab Take 10 mg by mouth 3 times a day as needed.      ciprofloxacin (CILOXIN) 0.3 % Solution Administer 1 Drop into both eyes 2 times a day as needed for Other. for recurrent corneal ulceration      Omega-3 Fatty Acids (FISH OIL) 1200 MG Cap Take 2,400 mg by mouth every day.      Multiple Vitamins-Minerals (CENTRUM WOMEN) Tab Take 1 Tablet by mouth every day.      Cholecalciferol (VITAMIN D) 2000 UNIT Tab Take 2,000 Units by mouth every day.      Zinc 50 MG Cap Take 50 mg by mouth every day.      Calcium Carb-Cholecalciferol 600-800 MG-UNIT Tab Take 1 Tablet by mouth every day.       No current facility-administered medications on file prior to visit.        Allergies: Patient has no known allergies.    Current Outpatient Medications Ordered in Epic   Medication Sig Dispense Refill    HYDROcodone-acetaminophen (NORCO) 5-325 MG Tab per tablet Take 1 Tablet by mouth at bedtime as needed (pain) for up to 30 days. 30 Tablet 0    omeprazole (PRILOSEC) 20 MG delayed-release capsule Take 1 capsule by mouth once daily 90 Capsule 3    escitalopram (LEXAPRO) 20 MG tablet Take 1 tablet by mouth once daily 90 Tablet 3    liothyronine (CYTOMEL) 5 MCG Tab Take 1 tablet by mouth once daily 90 Tablet 3    levothyroxine (SYNTHROID) 100 MCG Tab TAKE 1 TABLET BY MOUTH IN THE MORNING ON AN EMPTY STOMACH 90 Tablet 3    budesonide-formoterol (SYMBICORT) 160-4.5 MCG/ACT Aerosol Inhale 2 Puffs 2 times a day. 3 Each 3    rosuvastatin (CRESTOR) 5 MG Tab TAKE 1 TABLET BY MOUTH ONCE DAILY IN THE EVENING 90 Tablet 3    tretinoin (RETIN-A) 0.025 % cream       predniSONE (DELTASONE) 20 MG Tab TAKE 1 TO 2 TABLETS BY MOUTH AS  "DIRECTED      cyclobenzaprine (FLEXERIL) 10 mg Tab Take 10 mg by mouth 3 times a day as needed.      ciprofloxacin (CILOXIN) 0.3 % Solution Administer 1 Drop into both eyes 2 times a day as needed for Other. for recurrent corneal ulceration      Omega-3 Fatty Acids (FISH OIL) 1200 MG Cap Take 2,400 mg by mouth every day.      Multiple Vitamins-Minerals (CENTRUM WOMEN) Tab Take 1 Tablet by mouth every day.      Cholecalciferol (VITAMIN D) 2000 UNIT Tab Take 2,000 Units by mouth every day.      Zinc 50 MG Cap Take 50 mg by mouth every day.      Calcium Carb-Cholecalciferol 600-800 MG-UNIT Tab Take 1 Tablet by mouth every day.       No current [facility]-ordered facility-administered medications on file.       History reviewed. No pertinent past medical history.    Past Surgical History:   Procedure Laterality Date    THYROIDECTOMY  2020    KNEE ARTHROSCOPY Right 2020    CHOLECYSTECTOMY  2018    KNEE ARTHROSCOPY Left 2013    SINUSOTOMIES  2001    ABDOMINAL HYSTERECTOMY TOTAL  1986       Family History   Problem Relation Age of Onset    Alzheimer's Disease Mother     Heart Disease Father     Cancer Maternal Grandmother        Social History     Tobacco Use   Smoking Status Every Day    Types: Cigarettes   Smokeless Tobacco Never   Tobacco Comments    3 Cigarettes per day       Social History     Substance and Sexual Activity   Alcohol Use Yes    Comment: seldom       Review of systems.  As per HPI above. All other systems reviewed and negative.      Past Medical, Social, and Family history reviewed and updated in EPIC     Objective     /74   Pulse 96   Temp 36.3 °C (97.3 °F) (Temporal)   Resp 20   Ht 1.727 m (5' 8\")   Wt 86 kg (189 lb 8 oz)   SpO2 99%    Body mass index is 28.81 kg/m².    General: alert in no apparent distress.  Cardiovascular: regular rate and rhythm  Pulmonary: lungs : no wheezing   Gastrointestinal: BS present. No obvious mass noted  Low back: No significant spinal deformity noted.   Pain noted " "w palpation of the lumbar region.  ROM : flexion, extension, rotation, lateral bend of back limited due to pain         No results found for: \"HBA1C\"    Lab Results   Component Value Date/Time    WBC 10.5 08/07/2023 10:38 AM    HEMOGLOBIN 15.1 08/07/2023 10:38 AM    HEMATOCRIT 46.9 08/07/2023 10:38 AM    MCV 96.3 08/07/2023 10:38 AM    PLATELETCT 322 08/07/2023 10:38 AM         Lab Results   Component Value Date/Time    SODIUM 140 08/07/2023 10:38 AM    POTASSIUM 4.6 08/07/2023 10:38 AM    GLUCOSE 113 (H) 08/07/2023 10:38 AM    BUN 9 08/07/2023 10:38 AM    CREATININE 1.01 08/07/2023 10:38 AM       Lab Results   Component Value Date/Time    CHOLSTRLTOT 134 08/07/2023 10:38 AM    TRIGLYCERIDE 115 08/07/2023 10:38 AM    HDL 50 08/07/2023 10:38 AM    LDL 61 08/07/2023 10:38 AM       Lab Results   Component Value Date/Time    ALTSGPT 17 08/07/2023 10:38 AM             Assessment and Plan     1. Chronic low back pain, unspecified back pain laterality, unspecified whether sciatica present  Chronic stable condition.  I did refill hydrocodone for the patient.  Potential side effect of medication discussed with patient.  Patient signed consent form today.  Urine drug test ordered.  The patient has agreed this is a one-time refill.  Patient will go back to her PCP for future refill.    - HYDROcodone-acetaminophen (NORCO) 5-325 MG Tab per tablet; Take 1 Tablet by mouth at bedtime as needed (pain) for up to 30 days.  Dispense: 30 Tablet; Refill: 0  - Controlled Substance Treatment Agreement  - Pain Management Screen; Future    2. Mild intermittent asthma without complication  Chronic stable condition.  Continue with Symbicort 2 puff twice daily.  Rinse mouth after use.  The patient may use albuterol as needed for rescue treatment.    3. Mild episode of recurrent major depressive disorder (HCC)  Chronic stable condition.  Continue Lexapro 20 Mg daily    4. Postoperative hypothyroidism  Chronic stable condition continue " levothyroxine 100 mcg daily and Cytomel 5 mg daily.  Patient to follow-up with PCP to get lab test done for monitoring.    5. Mixed hyperlipidemia  Chronic condition.  Stable continue with Crestor 5 mg daily    6. Cigarette nicotine dependence without complication  Chronic condition.  Uncontrolled.  Strongly advised the patient to quit smoking.        Attestation: I spent 5 minutes of face to face counseling pt regarding nicotine cessation.   Discussed w pt and counseling on harmful effects of nicotine.     Strongly advised pt to quit.     Attestation: I spent:  31   min -  That includes time for chart review before the visit, the actual patient visit, and time spent on documentation in EMR after the visit.  Chart review/prep, review of other providers' records, imaging/lab review, face-to-face time for history/examination, pt's counseling/education, ordering, prescribing,  review of results/meds/ treatment plan with patient, and care coordination.             Please note that this dictation was created using voice recognition software. I have made every reasonable attempt to correct obvious errors, but I expect that there are errors of grammar and possibly content that I did not discover before finalizing the note.    Collin Lara MD  Internal Medicine  [facility]

## 2023-10-04 NOTE — ASSESSMENT & PLAN NOTE
Chronic condition.  The patient presently taking levothyroxine.  Patient tolerating medication well.

## 2023-10-04 NOTE — ASSESSMENT & PLAN NOTE
This is a chronic condition.  The patient is being treated with Lexapro 20 Mg daily.  The patient denies SI.

## 2023-10-30 ENCOUNTER — HOSPITAL ENCOUNTER (OUTPATIENT)
Dept: RADIOLOGY | Facility: MEDICAL CENTER | Age: 73
End: 2023-10-30
Attending: INTERNAL MEDICINE
Payer: MEDICARE

## 2023-10-30 ENCOUNTER — OFFICE VISIT (OUTPATIENT)
Dept: MEDICAL GROUP | Facility: PHYSICIAN GROUP | Age: 73
End: 2023-10-30
Payer: MEDICARE

## 2023-10-30 VITALS
WEIGHT: 192 LBS | BODY MASS INDEX: 29.1 KG/M2 | OXYGEN SATURATION: 98 % | HEIGHT: 68 IN | TEMPERATURE: 97.9 F | RESPIRATION RATE: 16 BRPM | HEART RATE: 60 BPM | SYSTOLIC BLOOD PRESSURE: 124 MMHG | DIASTOLIC BLOOD PRESSURE: 72 MMHG

## 2023-10-30 DIAGNOSIS — M79.604 LEG PAIN, BILATERAL: ICD-10-CM

## 2023-10-30 DIAGNOSIS — F33.0 MILD EPISODE OF RECURRENT MAJOR DEPRESSIVE DISORDER (HCC): ICD-10-CM

## 2023-10-30 DIAGNOSIS — J45.20 MILD INTERMITTENT ASTHMA WITHOUT COMPLICATION: ICD-10-CM

## 2023-10-30 DIAGNOSIS — E89.0 POSTOPERATIVE HYPOTHYROIDISM: ICD-10-CM

## 2023-10-30 DIAGNOSIS — K21.9 GASTROESOPHAGEAL REFLUX DISEASE, UNSPECIFIED WHETHER ESOPHAGITIS PRESENT: ICD-10-CM

## 2023-10-30 DIAGNOSIS — M79.605 LEG PAIN, BILATERAL: ICD-10-CM

## 2023-10-30 PROCEDURE — 73590 X-RAY EXAM OF LOWER LEG: CPT | Mod: RT

## 2023-10-30 PROCEDURE — 99215 OFFICE O/P EST HI 40 MIN: CPT | Performed by: INTERNAL MEDICINE

## 2023-10-30 PROCEDURE — 3078F DIAST BP <80 MM HG: CPT | Performed by: INTERNAL MEDICINE

## 2023-10-30 PROCEDURE — 3074F SYST BP LT 130 MM HG: CPT | Performed by: INTERNAL MEDICINE

## 2023-10-30 PROCEDURE — 73590 X-RAY EXAM OF LOWER LEG: CPT | Mod: LT

## 2023-10-30 ASSESSMENT — FIBROSIS 4 INDEX: FIB4 SCORE: 1.18

## 2023-10-30 NOTE — ASSESSMENT & PLAN NOTE
Chronic condition.  The patient is taking omeprazole 20 Mg daily.  The patient denies nausea vomiting dysphagia or unexplained weight loss.

## 2023-10-30 NOTE — PROGRESS NOTES
PRIMARY CARE CLINIC VISIT        Chief Complaint   Patient presents with    Follow-Up        Bilateral leg pain  Hypothyroidism   acid reflux  Depression  Asthma    Pcp Conchis Vargas M.D.     History of Present Illness     Leg pain, bilateral  This is a new condition.  The patient reported recurrent pain in the anterior tibial region bilaterally noted since the last several weeks.  Patient denies recent trauma or injury.  She denies significant swelling.  Patient reported that she recently had venous ultrasound of both legs done outside [facility] which were reportedly negative for DVT.  Unfortunately I do not have the report for confirmation.  Patient denies any chest pain shortness of breath.  She denies prior history of DVT or pulmonary embolism.    Postoperative hypothyroidism  Chronic condition.  Patient is being treated with levothyroxine 100 mcg daily and Cytomel 5 Mcg daily.  Patient tolerating medication well.    GERD (gastroesophageal reflux disease)  Chronic condition.  The patient is taking omeprazole 20 Mg daily.  The patient denies nausea vomiting dysphagia or unexplained weight loss.    MDD (major depressive disorder)  Chronic condition.  The patient is being treated with Lexapro 20 Mg daily.  The patient denies SI.  Patient tolerating medication well.    Asthma  Chronic condition.  Patient uses Symbicort 2 puffs twice daily and albuterol as needed.  At the present time the patient denies shortness of breath or wheezing.    Current Outpatient Medications on File Prior to Visit   Medication Sig Dispense Refill    HYDROcodone-acetaminophen (NORCO) 5-325 MG Tab per tablet Take 1 Tablet by mouth at bedtime as needed (pain) for up to 30 days. 30 Tablet 0    omeprazole (PRILOSEC) 20 MG delayed-release capsule Take 1 capsule by mouth once daily 90 Capsule 3    escitalopram (LEXAPRO) 20 MG tablet Take 1 tablet by mouth once daily 90 Tablet 3    liothyronine (CYTOMEL) 5 MCG Tab Take 1 tablet by mouth once  daily 90 Tablet 3    levothyroxine (SYNTHROID) 100 MCG Tab TAKE 1 TABLET BY MOUTH IN THE MORNING ON AN EMPTY STOMACH 90 Tablet 3    budesonide-formoterol (SYMBICORT) 160-4.5 MCG/ACT Aerosol Inhale 2 Puffs 2 times a day. 3 Each 3    rosuvastatin (CRESTOR) 5 MG Tab TAKE 1 TABLET BY MOUTH ONCE DAILY IN THE EVENING 90 Tablet 3    tretinoin (RETIN-A) 0.025 % cream       predniSONE (DELTASONE) 20 MG Tab Has 5 pills for emergency      cyclobenzaprine (FLEXERIL) 10 mg Tab Take 10 mg by mouth 3 times a day as needed.      ciprofloxacin (CILOXIN) 0.3 % Solution Administer 1 Drop into both eyes 2 times a day as needed for Other. for recurrent corneal ulceration      Omega-3 Fatty Acids (FISH OIL) 1200 MG Cap Take 2,400 mg by mouth every day.      Multiple Vitamins-Minerals (CENTRUM WOMEN) Tab Take 1 Tablet by mouth every day.      Cholecalciferol (VITAMIN D) 2000 UNIT Tab Take 2,000 Units by mouth every day.      Zinc 50 MG Cap Take 50 mg by mouth every day.      Calcium Carb-Cholecalciferol 600-800 MG-UNIT Tab Take 1 Tablet by mouth every day.       No current facility-administered medications on file prior to visit.        Allergies: Patient has no known allergies.    Current Outpatient Medications Ordered in Epic   Medication Sig Dispense Refill    HYDROcodone-acetaminophen (NORCO) 5-325 MG Tab per tablet Take 1 Tablet by mouth at bedtime as needed (pain) for up to 30 days. 30 Tablet 0    omeprazole (PRILOSEC) 20 MG delayed-release capsule Take 1 capsule by mouth once daily 90 Capsule 3    escitalopram (LEXAPRO) 20 MG tablet Take 1 tablet by mouth once daily 90 Tablet 3    liothyronine (CYTOMEL) 5 MCG Tab Take 1 tablet by mouth once daily 90 Tablet 3    levothyroxine (SYNTHROID) 100 MCG Tab TAKE 1 TABLET BY MOUTH IN THE MORNING ON AN EMPTY STOMACH 90 Tablet 3    budesonide-formoterol (SYMBICORT) 160-4.5 MCG/ACT Aerosol Inhale 2 Puffs 2 times a day. 3 Each 3    rosuvastatin (CRESTOR) 5 MG Tab TAKE 1 TABLET BY MOUTH ONCE  "DAILY IN THE EVENING 90 Tablet 3    tretinoin (RETIN-A) 0.025 % cream       predniSONE (DELTASONE) 20 MG Tab Has 5 pills for emergency      cyclobenzaprine (FLEXERIL) 10 mg Tab Take 10 mg by mouth 3 times a day as needed.      ciprofloxacin (CILOXIN) 0.3 % Solution Administer 1 Drop into both eyes 2 times a day as needed for Other. for recurrent corneal ulceration      Omega-3 Fatty Acids (FISH OIL) 1200 MG Cap Take 2,400 mg by mouth every day.      Multiple Vitamins-Minerals (CENTRUM WOMEN) Tab Take 1 Tablet by mouth every day.      Cholecalciferol (VITAMIN D) 2000 UNIT Tab Take 2,000 Units by mouth every day.      Zinc 50 MG Cap Take 50 mg by mouth every day.      Calcium Carb-Cholecalciferol 600-800 MG-UNIT Tab Take 1 Tablet by mouth every day.       No current [facility]-ordered facility-administered medications on file.       No past medical history on file.    Past Surgical History:   Procedure Laterality Date    THYROIDECTOMY  2020    KNEE ARTHROSCOPY Right 2020    CHOLECYSTECTOMY  2018    KNEE ARTHROSCOPY Left 2013    SINUSOTOMIES  2001    ABDOMINAL HYSTERECTOMY TOTAL  1986       Family History   Problem Relation Age of Onset    Alzheimer's Disease Mother     Heart Disease Father     Cancer Maternal Grandmother        Social History     Tobacco Use   Smoking Status Every Day    Types: Cigarettes   Smokeless Tobacco Never   Tobacco Comments    3 or more Cigarettes per day       Social History     Substance and Sexual Activity   Alcohol Use Yes    Comment: seldom       Review of systems.  As per HPI above. All other systems reviewed and negative.      Past Medical, Social, and Family history reviewed and updated in EPIC     Objective     /72 (BP Location: Left arm, Patient Position: Sitting, BP Cuff Size: Adult)   Pulse 60   Temp 36.6 °C (97.9 °F) (Temporal)   Resp 16   Ht 1.727 m (5' 8\")   Wt 87.1 kg (192 lb)   SpO2 98%    Body mass index is 29.19 kg/m².    General: alert in no apparent " "distress.  Cardiovascular: regular rate and rhythm  Pulmonary: lungs : no wheezing   Gastrointestinal: BS present.   Legs there is no swelling redness or deformity.  No palpable cords.  There is no redness swelling or discharge.  Negative Homans' sign.  The anterior tibial region no acute abnormality noted.  The patient reported that the pain actually has improved with massaging the anterior tibialis muscle region bilaterally.      No results found for: \"HBA1C\"    Lab Results   Component Value Date/Time    WBC 10.5 08/07/2023 10:38 AM    HEMOGLOBIN 15.1 08/07/2023 10:38 AM    HEMATOCRIT 46.9 08/07/2023 10:38 AM    MCV 96.3 08/07/2023 10:38 AM    PLATELETCT 322 08/07/2023 10:38 AM         Lab Results   Component Value Date/Time    CHOLSTRLTOT 134 08/07/2023 10:38 AM    TRIGLYCERIDE 115 08/07/2023 10:38 AM    HDL 50 08/07/2023 10:38 AM    LDL 61 08/07/2023 10:38 AM       Lab Results   Component Value Date/Time    ALTSGPT 17 08/07/2023 10:38 AM             Assessment and Plan     1. Leg pain, bilateral  This is a new condition. Could be due to shint splints  Patient reported recurrent pain anterior tibial region bilaterally since the last several weeks.  As above the patient reported that she recently had venous ultrasound done of both legs outside [facility] which were negative.  Unfortunately I do not have the report for confirmation.      I have searched extensively in epic computer system however unable to locate the ultrasound    Advised the patient to follow-up with her PCP in a couple weeks and to bring in the ultrasound report for review.  In the interim I have ordered x-rays of the tibia/fibula to rule out possible stress fractures versus others  Recommend gentle stretching exercises.  Patient advised to try over-the-counter Bengay ointment rubs as needed.    - DX-TIBIA AND FIBULA LEFT; Future  - DX-TIBIA AND FIBULA RIGHT; Future  Patient also advised to try over-the-counter Tylenol 500 mg p.o. 3 times daily prn " For pain    2. Postoperative hypothyroidism  Chronic condition.  Continue with levothyroxine 100 mcg and Cytomel 5 mcg daily.  Patient to follow-up with her PCP    3. Gastroesophageal reflux disease, unspecified whether esophagitis present  Chronic condition.  Continue omeprazole 20 Mg daily.  Patient to follow-up with PCP    4. Mild episode of recurrent major depressive disorder (HCC)  Condition.  Continue Lexapro 20 Mg daily    5. Mild intermittent asthma without complication  Chronic condition.  Continue with Symbicort 2 puff twice daily.  Rinse mouth after use.  Patient may use albuterol as needed.  Follow-up with PCP    Attestation: I spent:   41  min -  That includes time for chart review before the visit, the actual patient visit, and time spent on documentation in EMR after the visit.  Chart review/prep, review of other providers' records, imaging/lab review, face-to-face time for history/examination, pt's counseling/education, ordering, prescribing,  review of results/meds/ treatment plan with patient, and care coordination.  The patient is of extensive complexity.           Please note that this dictation was created using voice recognition software. I have made every reasonable attempt to correct obvious errors, but I expect that there are errors of grammar and possibly content that I did not discover before finalizing the note.    Collin Lara MD  Internal Medicine  [facility]

## 2023-10-30 NOTE — ASSESSMENT & PLAN NOTE
Chronic condition.  Patient uses Symbicort 2 puffs twice daily and albuterol as needed.  At the present time the patient denies shortness of breath or wheezing.

## 2023-10-30 NOTE — ASSESSMENT & PLAN NOTE
Chronic condition.  The patient is being treated with Lexapro 20 Mg daily.  The patient denies SI.  Patient tolerating medication well.

## 2023-10-30 NOTE — ASSESSMENT & PLAN NOTE
Chronic condition.  Patient is being treated with levothyroxine 100 mcg daily and Cytomel 5 Mcg daily.  Patient tolerating medication well.

## 2023-10-30 NOTE — ASSESSMENT & PLAN NOTE
This is a new condition.  The patient reported recurrent pain in the anterior tibial region bilaterally noted since the last several weeks.  Patient denies recent trauma or injury.  She denies significant swelling.  Patient reported that she recently had venous ultrasound of both legs done outside [facility] which were reportedly negative for DVT.  Unfortunately I do not have the report for confirmation.  Patient denies any chest pain shortness of breath.  She denies prior history of DVT or pulmonary embolism.

## 2023-12-11 ENCOUNTER — OFFICE VISIT (OUTPATIENT)
Dept: URGENT CARE | Facility: PHYSICIAN GROUP | Age: 73
End: 2023-12-11
Payer: MEDICARE

## 2023-12-11 VITALS
HEART RATE: 99 BPM | OXYGEN SATURATION: 96 % | WEIGHT: 188 LBS | RESPIRATION RATE: 16 BRPM | SYSTOLIC BLOOD PRESSURE: 110 MMHG | DIASTOLIC BLOOD PRESSURE: 70 MMHG | TEMPERATURE: 97.1 F | HEIGHT: 68 IN | BODY MASS INDEX: 28.49 KG/M2

## 2023-12-11 DIAGNOSIS — B96.89 ACUTE BACTERIAL SINUSITIS: ICD-10-CM

## 2023-12-11 DIAGNOSIS — J01.90 ACUTE BACTERIAL SINUSITIS: ICD-10-CM

## 2023-12-11 PROCEDURE — 3074F SYST BP LT 130 MM HG: CPT | Performed by: NURSE PRACTITIONER

## 2023-12-11 PROCEDURE — 3078F DIAST BP <80 MM HG: CPT | Performed by: NURSE PRACTITIONER

## 2023-12-11 PROCEDURE — 99213 OFFICE O/P EST LOW 20 MIN: CPT | Performed by: NURSE PRACTITIONER

## 2023-12-11 RX ORDER — AMOXICILLIN AND CLAVULANATE POTASSIUM 875; 125 MG/1; MG/1
1 TABLET, FILM COATED ORAL 2 TIMES DAILY
Qty: 14 TABLET | Refills: 0 | Status: SHIPPED | OUTPATIENT
Start: 2023-12-11 | End: 2023-12-18

## 2023-12-11 ASSESSMENT — FIBROSIS 4 INDEX: FIB4 SCORE: 1.18

## 2023-12-11 NOTE — PROGRESS NOTES
Sola Garcia is a 73 y.o. female who presents for Sinusitis (X 10 days)      HPI  This is a new problem. Sola Garcia is a 73 y.o. patient who presents to urgent care with c/o: sinus pain for 10 days. Bloody nasal drainage for a few days now. Mucous is off white color. Low grade fevers. Her symptoms started after [facility] a [facility]. Tx tried: Sudafed, sinutab, tylenol sinus. Nothing is working.     ROS See HPI    Allergies:     No Known Allergies    PMSFS Hx:  No past medical history on file.  Past Surgical History:   Procedure Laterality Date    THYROIDECTOMY  2020    KNEE ARTHROSCOPY Right 2020    CHOLECYSTECTOMY  2018    KNEE ARTHROSCOPY Left 2013    SINUSOTOMIES  2001    ABDOMINAL HYSTERECTOMY TOTAL  1986     Family History   Problem Relation Age of Onset    Alzheimer's Disease Mother     Heart Disease Father     Cancer Maternal Grandmother      Social History     Tobacco Use    Smoking status: Every Day     Types: Cigarettes    Smokeless tobacco: Never    Tobacco comments:     3 or more Cigarettes per day   Substance Use Topics    Alcohol use: Yes     Comment: seldom       Problems:   Patient Active Problem List   Diagnosis    Postoperative hypothyroidism    GERD (gastroesophageal reflux disease)    MDD (major depressive disorder)    Asthma    Corneal ulcer    Chronic lower back pain    Chronic pain of both knees    Allergic reaction to COVID-19 vaccine    Mixed hyperlipidemia    DIMITRI (generalized anxiety disorder)    Acute cough    Nicotine dependence    Leg pain, bilateral       Medications:   Current Outpatient Medications on File Prior to Visit   Medication Sig Dispense Refill    rosuvastatin (CRESTOR) 5 MG Tab TAKE 1 TABLET BY MOUTH ONCE DAILY IN THE EVENING 90 Tablet 3    omeprazole (PRILOSEC) 20 MG delayed-release capsule Take 1 capsule by mouth once daily 90 Capsule 3    escitalopram (LEXAPRO) 20 MG tablet Take 1 tablet by mouth once daily 90 Tablet 3    liothyronine  "(CYTOMEL) 5 MCG Tab Take 1 tablet by mouth once daily 90 Tablet 3    levothyroxine (SYNTHROID) 100 MCG Tab TAKE 1 TABLET BY MOUTH IN THE MORNING ON AN EMPTY STOMACH 90 Tablet 3    budesonide-formoterol (SYMBICORT) 160-4.5 MCG/ACT Aerosol Inhale 2 Puffs 2 times a day. 3 Each 3    tretinoin (RETIN-A) 0.025 % cream       predniSONE (DELTASONE) 20 MG Tab Has 5 pills for emergency      cyclobenzaprine (FLEXERIL) 10 mg Tab Take 10 mg by mouth 3 times a day as needed.      ciprofloxacin (CILOXIN) 0.3 % Solution Administer 1 Drop into both eyes 2 times a day as needed for Other. for recurrent corneal ulceration      Omega-3 Fatty Acids (FISH OIL) 1200 MG Cap Take 2,400 mg by mouth every day.      Multiple Vitamins-Minerals (CENTRUM WOMEN) Tab Take 1 Tablet by mouth every day.      Cholecalciferol (VITAMIN D) 2000 UNIT Tab Take 2,000 Units by mouth every day.      Zinc 50 MG Cap Take 50 mg by mouth every day.      Calcium Carb-Cholecalciferol 600-800 MG-UNIT Tab Take 1 Tablet by mouth every day.       No current facility-administered medications on file prior to visit.        Objective:     /70 (BP Location: Left arm, Patient Position: Sitting, BP Cuff Size: Adult long)   Pulse 99   Temp 36.2 °C (97.1 °F) (Temporal)   Resp 16   Ht 1.727 m (5' 8\")   Wt 85.3 kg (188 lb)   SpO2 96%   BMI 28.59 kg/m²     Physical Exam  Vitals and nursing note reviewed.   Constitutional:       General: She is not in acute distress.     Appearance: Normal appearance. She is well-developed. She is ill-appearing.   HENT:      Head: Normocephalic.      Right Ear: Hearing, tympanic membrane, ear canal and external ear normal.      Left Ear: Hearing, tympanic membrane, ear canal and external ear normal.      Nose: Mucosal edema, congestion and rhinorrhea present. Rhinorrhea is purulent.      Right Sinus: Maxillary sinus tenderness present. No frontal sinus tenderness.      Left Sinus: Maxillary sinus tenderness present. No frontal sinus " tenderness.      Mouth/Throat:      Mouth: Mucous membranes are moist.      Pharynx: Uvula midline. Oropharyngeal exudate present. No posterior oropharyngeal erythema.   Eyes:      General:         Right eye: No discharge.         Left eye: No discharge.      Conjunctiva/sclera:      Right eye: Right conjunctiva is injected.      Left eye: Left conjunctiva is injected.      Pupils: Pupils are equal, round, and reactive to light.   Neck:      Trachea: Trachea and phonation normal.   Cardiovascular:      Rate and Rhythm: Normal rate and regular rhythm.      Chest Wall: PMI is not displaced.      Pulses: Normal pulses.      Heart sounds: Normal heart sounds.   Pulmonary:      Effort: Pulmonary effort is normal.      Breath sounds: Normal breath sounds.   Musculoskeletal:      Cervical back: Full passive range of motion without pain, normal range of motion and neck supple.   Lymphadenopathy:      Head:      Right side of head: No tonsillar adenopathy.      Left side of head: No tonsillar adenopathy.      Cervical: No cervical adenopathy.      Upper Body:      Right upper body: No supraclavicular adenopathy.      Left upper body: No supraclavicular adenopathy.   Skin:     General: Skin is warm and dry.      Capillary Refill: Capillary refill takes less than 2 seconds.   Neurological:      General: No focal deficit present.      Mental Status: She is alert and oriented to person, place, and time.      Gait: Gait normal.   Psychiatric:         Mood and Affect: Mood normal.         Speech: Speech normal.         Behavior: Behavior normal. Behavior is cooperative.         Thought Content: Thought content normal.         Assessment /Associated Orders:      1. Acute bacterial sinusitis  amoxicillin-clavulanate (AUGMENTIN) 875-125 MG Tab            Medical Decision Making:    Surendra  is a very pleasant 73 y.o. female who is clinically stable at today's acute urgent care visit.  No acute distress noted.  VSS. Appropriate for  outpatient care at this time.   Acute problem today with uncertain prognosis.   FU with PCP or return to Urgent Care in 5-7 days if no improvement in symptoms, sooner if increased or worsening symptoms.   Humidifier at noc may be beneficial.   Keep well hydrated  Educated in proper administration of  prescription medication(s) ordered today including safety, possible SE, risks, benefits, rationale and alternatives to therapy.   OTC antihistamine of choice. Follow manufactures dosing and safety guidelines.               Please note that this dictation was created using voice recognition software. I have worked with consultants from the vendor as well as technical experts from [employer] to optimize the interface. I have made every reasonable attempt to correct obvious errors, but I expect that there are errors of grammar and possibly content that I did not discover before finalizing the note.  This note was electronically signed by provider

## 2023-12-22 DIAGNOSIS — B96.89 ACUTE BACTERIAL SINUSITIS: ICD-10-CM

## 2023-12-22 DIAGNOSIS — J01.90 ACUTE BACTERIAL SINUSITIS: ICD-10-CM

## 2023-12-27 ENCOUNTER — APPOINTMENT (OUTPATIENT)
Dept: URGENT CARE | Facility: PHYSICIAN GROUP | Age: 73
End: 2023-12-27
Payer: MEDICARE

## 2023-12-29 ENCOUNTER — OFFICE VISIT (OUTPATIENT)
Dept: URGENT CARE | Facility: PHYSICIAN GROUP | Age: 73
End: 2023-12-29
Payer: MEDICARE

## 2023-12-29 VITALS
RESPIRATION RATE: 16 BRPM | WEIGHT: 188 LBS | HEART RATE: 76 BPM | OXYGEN SATURATION: 94 % | SYSTOLIC BLOOD PRESSURE: 118 MMHG | BODY MASS INDEX: 28.49 KG/M2 | HEIGHT: 68 IN | DIASTOLIC BLOOD PRESSURE: 72 MMHG | TEMPERATURE: 97.3 F

## 2023-12-29 DIAGNOSIS — B96.89 ACUTE BACTERIAL SINUSITIS: ICD-10-CM

## 2023-12-29 DIAGNOSIS — J01.90 ACUTE BACTERIAL SINUSITIS: ICD-10-CM

## 2023-12-29 PROCEDURE — 3078F DIAST BP <80 MM HG: CPT | Performed by: REGISTERED NURSE

## 2023-12-29 PROCEDURE — 3074F SYST BP LT 130 MM HG: CPT | Performed by: REGISTERED NURSE

## 2023-12-29 PROCEDURE — 99214 OFFICE O/P EST MOD 30 MIN: CPT | Performed by: REGISTERED NURSE

## 2023-12-29 RX ORDER — DOXYCYCLINE HYCLATE 100 MG
100 TABLET ORAL 2 TIMES DAILY
Qty: 20 TABLET | Refills: 0 | Status: SHIPPED | OUTPATIENT
Start: 2023-12-29 | End: 2024-01-08

## 2023-12-29 ASSESSMENT — ENCOUNTER SYMPTOMS
NECK PAIN: 0
CHILLS: 0
DIZZINESS: 0
FEVER: 0

## 2023-12-29 ASSESSMENT — VISUAL ACUITY: OU: 1

## 2023-12-29 ASSESSMENT — FIBROSIS 4 INDEX: FIB4 SCORE: 1.18

## 2023-12-29 NOTE — PROGRESS NOTES
Subjective:   Sola Garcia is a 73 y.o. female who presents for Sinus Problem (Pressure pain requesting more antibiotics . )      HPI  Patient presenting for reevaluation of ongoing sinusitis. She is sneezing, sinus pressure with thick drainage. Hx of sinus surgeries. Just finished course of amox-clav the symptoms seems to be improving but once the medication ran out all of them came back.  Using OTC cold and sinus medicine.  Denies high fever over 102, eye pain, acute vision changes, neck stiffness, equilibrium disturbances, unilateral/bilateral weakness.    Review of Systems   Constitutional:  Negative for chills and fever.   Musculoskeletal:  Negative for neck pain.   Skin:  Negative for rash.   Neurological:  Negative for dizziness.       No Known Allergies    Patient Active Problem List    Diagnosis Date Noted    Leg pain, bilateral 10/30/2023    Nicotine dependence 10/03/2023    Acute cough 07/26/2023    DIMITRI (generalized anxiety disorder) 01/11/2022    Mixed hyperlipidemia 12/03/2021    Chronic lower back pain 10/14/2021    Chronic pain of both knees 10/14/2021    Allergic reaction to COVID-19 vaccine 10/14/2021    Postoperative hypothyroidism 10/12/2021    GERD (gastroesophageal reflux disease) 10/12/2021    MDD (major depressive disorder) 10/12/2021    Asthma 10/12/2021    Corneal ulcer 10/12/2021       Current Outpatient Medications Ordered in Epic   Medication Sig Dispense Refill    doxycycline (VIBRAMYCIN) 100 MG Tab Take 1 Tablet by mouth 2 times a day for 10 days. 20 Tablet 0    rosuvastatin (CRESTOR) 5 MG Tab TAKE 1 TABLET BY MOUTH ONCE DAILY IN THE EVENING 90 Tablet 3    omeprazole (PRILOSEC) 20 MG delayed-release capsule Take 1 capsule by mouth once daily 90 Capsule 3    escitalopram (LEXAPRO) 20 MG tablet Take 1 tablet by mouth once daily 90 Tablet 3    liothyronine (CYTOMEL) 5 MCG Tab Take 1 tablet by mouth once daily 90 Tablet 3    levothyroxine (SYNTHROID) 100 MCG Tab TAKE 1 TABLET BY  "MOUTH IN THE MORNING ON AN EMPTY STOMACH 90 Tablet 3    budesonide-formoterol (SYMBICORT) 160-4.5 MCG/ACT Aerosol Inhale 2 Puffs 2 times a day. 3 Each 3    tretinoin (RETIN-A) 0.025 % cream       predniSONE (DELTASONE) 20 MG Tab Has 5 pills for emergency      cyclobenzaprine (FLEXERIL) 10 mg Tab Take 10 mg by mouth 3 times a day as needed.      ciprofloxacin (CILOXIN) 0.3 % Solution Administer 1 Drop into both eyes 2 times a day as needed for Other. for recurrent corneal ulceration      Omega-3 Fatty Acids (FISH OIL) 1200 MG Cap Take 2,400 mg by mouth every day.      Multiple Vitamins-Minerals (CENTRUM WOMEN) Tab Take 1 Tablet by mouth every day.      Cholecalciferol (VITAMIN D) 2000 UNIT Tab Take 2,000 Units by mouth every day.      Zinc 50 MG Cap Take 50 mg by mouth every day.      Calcium Carb-Cholecalciferol 600-800 MG-UNIT Tab Take 1 Tablet by mouth every day.       No current [facility]-ordered facility-administered medications on file.       Past Surgical History:   Procedure Laterality Date    THYROIDECTOMY  2020    KNEE ARTHROSCOPY Right 2020    CHOLECYSTECTOMY  2018    KNEE ARTHROSCOPY Left 2013    SINUSOTOMIES  2001    ABDOMINAL HYSTERECTOMY TOTAL  1986       Social History     Tobacco Use    Smoking status: Every Day     Types: Cigarettes    Smokeless tobacco: Never    Tobacco comments:     3 or more Cigarettes per day   Vaping Use    Vaping Use: Never used   Substance Use Topics    Alcohol use: Yes     Comment: seldom    Drug use: Never       family history includes Alzheimer's Disease in her mother; Cancer in her maternal grandmother; Heart Disease in her father.     Problem list, medications, and allergies reviewed by myself today in Epic.     Objective:   /72   Pulse 76   Temp 36.3 °C (97.3 °F) (Temporal)   Resp 16   Ht 1.727 m (5' 8\")   Wt 85.3 kg (188 lb)   SpO2 94%   BMI 28.59 kg/m²     Physical Exam  Vitals and nursing note reviewed.   Constitutional:       General: She is not in acute " distress.     Appearance: Normal appearance. She is well-developed. She is not ill-appearing, toxic-appearing or diaphoretic.   HENT:      Head: Normocephalic and atraumatic.      Right Ear: Hearing, tympanic membrane, ear canal and external ear normal. No decreased hearing noted. Tympanic membrane is not erythematous.      Left Ear: Hearing, tympanic membrane, ear canal and external ear normal. No decreased hearing noted. Tympanic membrane is not erythematous.      Nose: Mucosal edema, congestion and rhinorrhea present. Rhinorrhea is purulent.      Right Turbinates: Swollen.      Left Turbinates: Swollen.      Right Sinus: Maxillary sinus tenderness present.      Left Sinus: Maxillary sinus tenderness present.      Mouth/Throat:      Mouth: Mucous membranes are moist.      Dentition: Normal dentition.      Pharynx: Posterior oropharyngeal erythema present. No oropharyngeal exudate.   Eyes:      General: Lids are normal. Vision grossly intact. No scleral icterus.     Conjunctiva/sclera: Conjunctivae normal.   Cardiovascular:      Rate and Rhythm: Normal rate and regular rhythm.      Pulses: Normal pulses.      Heart sounds: Normal heart sounds. No murmur heard.  Pulmonary:      Effort: Pulmonary effort is normal. No respiratory distress.      Breath sounds: Normal breath sounds. No wheezing, rhonchi or rales.   Musculoskeletal:      Cervical back: Normal range of motion and neck supple.   Lymphadenopathy:      Cervical: No cervical adenopathy.   Skin:     General: Skin is warm and dry.      Nails: There is no clubbing.   Neurological:      General: No focal deficit present.      Mental Status: She is alert and oriented to person, place, and time. Mental status is at baseline.   Psychiatric:         Mood and Affect: Mood normal.         Assessment/Plan:     Differential diagnosis discussed     1. Acute bacterial sinusitis  doxycycline (VIBRAMYCIN) 100 MG Tab        Presenting for reevaluation of recurrent sinus  infection was improving but then when Augmentin ran out symptoms returned.  No red flag signs or symptoms.  History of chronic sinusitis and sinus surgery.  Vital signs reassuring.  Exam findings consistent with sinusitis.  Will switch to doxycycline.  She does have follow-up with primary care in 4 to 5 days which is a great time to reevaluate symptoms.  Continue with sinus regimen.  We did discuss signs and symptoms that require immediate attention.    Return to clinic or go to ED if symptoms worsen or persist. Indications for ED discussed at length. Red flag symptoms discussed. All side effects of medication discussed including allergic response, GI upset, tendon injury, rash, sedation etc. Patient and/or guardian voices understanding.     I personally reviewed prior external notes and test results pertinent to today's visit as well as additional imaging and testing completed in clinic today.     Please note that this dictation was created using voice recognition software. I have made every reasonable attempt to correct obvious errors, but I expect that there are errors of grammar and possibly content that I did not discover before finalizing the note.    This note was electronically signed by RICHARD Pryor

## 2024-01-02 ENCOUNTER — APPOINTMENT (OUTPATIENT)
Dept: MEDICAL GROUP | Facility: PHYSICIAN GROUP | Age: 74
End: 2024-01-02
Payer: MEDICARE

## 2024-01-03 NOTE — PROGRESS NOTES
Subjective:     CC:   Chief Complaint   Patient presents with    Follow-Up    Sinus Problem     Urgent care x 2         HPI:   Sola Garcia is a 73-year-old female who presents for follow-up visit.  She is here for medication refill of her controlled substance Norco 5-3 25.  She is doing well on her current regimen and has had no issues developed.  There has been no evidence of dose escalation.  Her controlled substance treatment agreement is complete and she is up-to-date on her urine drug screen.  Her most recent labs were within normal range and she will continue on her current regimen of levothyroxine 100 mcg daily and liothyronine 5 mcg daily, she is due for updated labs.  Her most recent lipid panel is at goal with a total cholesterol of 134, LDL 61, HDL 50, and triglycerides 115.  She will continue her current regimen of rosuvastatin 5 mg nightly.        History reviewed. No pertinent past medical history.    Social History     Tobacco Use    Smoking status: Every Day     Types: Cigarettes    Smokeless tobacco: Never    Tobacco comments:     3 or more Cigarettes per day   Vaping Use    Vaping Use: Never used   Substance Use Topics    Alcohol use: Yes     Comment: seldom    Drug use: Never       Current Outpatient Medications Ordered in Epic   Medication Sig Dispense Refill    rosuvastatin (CRESTOR) 5 MG Tab TAKE 1 TABLET BY MOUTH ONCE DAILY IN THE EVENING 90 Tablet 3    omeprazole (PRILOSEC) 20 MG delayed-release capsule Take 1 capsule by mouth once daily 90 Capsule 3    escitalopram (LEXAPRO) 20 MG tablet Take 1 tablet by mouth once daily 90 Tablet 3    liothyronine (CYTOMEL) 5 MCG Tab Take 1 tablet by mouth once daily 90 Tablet 3    levothyroxine (SYNTHROID) 100 MCG Tab TAKE 1 TABLET BY MOUTH IN THE MORNING ON AN EMPTY STOMACH 90 Tablet 3    tretinoin (RETIN-A) 0.025 % cream       predniSONE (DELTASONE) 20 MG Tab Has 5 pills for emergency      cyclobenzaprine (FLEXERIL) 10 mg Tab Take 10 mg by  "mouth 3 times a day as needed.      Omega-3 Fatty Acids (FISH OIL) 1200 MG Cap Take 2,400 mg by mouth every day.      Multiple Vitamins-Minerals (CENTRUM WOMEN) Tab Take 1 Tablet by mouth every day.      Cholecalciferol (VITAMIN D) 2000 UNIT Tab Take 2,000 Units by mouth every day.      Zinc 50 MG Cap Take 50 mg by mouth every day.      Calcium Carb-Cholecalciferol 600-800 MG-UNIT Tab Take 1 Tablet by mouth every day.      fluticasone furoate-vilanterol (BREO ELLIPTA) 100-25 MCG/ACT AEROSOL POWDER, BREATH ACTIVATED Inhale 1 Puff every day. 3 Each 3     No current Epic-ordered facility-administered medications on file.       Allergies:  Patient has no known allergies.    Health Maintenance: Completed    ROS:  No fevers or chills. No cough, chest pain, or shortness of breath.       Objective:       Exam:  /82 (BP Location: Right arm, Patient Position: Sitting, BP Cuff Size: Adult)   Pulse 82   Temp 36.5 °C (97.7 °F) (Temporal)   Resp 16   Ht 1.727 m (5' 8\")   Wt 85.6 kg (188 lb 12.8 oz)   SpO2 96%   BMI 28.71 kg/m²  Body mass index is 28.71 kg/m².    Gen: Alert and oriented, No apparent distress.  Lungs: Normal effort, CTA bilaterally, no wheezes, rhonchi, or rales  CV: Regular rate and rhythm. No murmurs, rubs, or gallops.  Ext: No clubbing, cyanosis, edema.        Assessment & Plan:     73 y.o. female with the following -     Chronic low back pain, unspecified back pain laterality, unspecified whether sciatica present  Chronic pain of both knees  Chronic conditions, controlled.  The patient has chronic lower back pain for which she takes Norco and cyclobenzaprine on an as-needed basis.  The patient's symptoms are well controlled on her current regimen.  There has been no evidence of dose escalation.  Review of the patient's  shows that she was last given a prescription for Norco 5-325 mg, dispo # 30, on 10/3/2023. Obtained and reviewed patient utilization report from [facility] pharmacy database on " 1/4.2024 1:48 PM  prior to writing prescription for controlled substance II, III or IV per [place] bill . Based on assessment of the report, the prescription is medically necessary.   -Continue Norco 5-325 mg and cyclobenzaprine 10 mg on an as-needed basis  - Controlled Substance Treatment Agreement signed and scanned into the patient's chart on 3/10/2023  - Urine drug screen up-to-date on 3/10/2023   - HYDROcodone-acetaminophen (NORCO) 5-325 MG Tab per tablet; Take 1 Tablet by mouth every 8 hours as needed (pain) for up to 30 days.  Dispense: 30 Tablet; Refill: 0     Postoperative hypothyroidism  Chronic condition, controlled.  The patient currently takes levothyroxine 100 mcg daily and liothyronine 5 mcg daily.  Labs from 8/7/2023 showed a TSH of 0.78 and a free T4 of labs from 2/16/2023 showed a normal the patient is clinically euthyroid.    -Continue current regimen of levothyroxine 100 mcg daily and liothyronine 5 mcg daily  - TSH; Future  - FREE THYROXINE; Future  - T3 FREE; Future     Mixed hyperlipidemia  Chronic condition, controlled.  The patient currently takes rosuvastatin 5 mg nightly.  Most recent lipid panel on 8/7/2023 showed a total cholesterol of 134, LDL 61, HDL 50, triglycerides 115.  He denies statin associated myalgias.  95.  She denies statin associated myalgias.  -Continue current regimen of rosuvastatin 5 mg nightly  - Lipid Profile; Future  - Comp Metabolic Panel; Future     Screening for deficiency anemia  - CBC WITH DIFFERENTIAL; Future        Please note that this dictation was created using voice recognition software. I have made every reasonable attempt to correct obvious errors, but I expect that there are errors of grammar and possibly content that I did not discover before finalizing the note.                        Return in about 3 months (around 4/4/2024).    Please note that this dictation was created using voice recognition software. I have made every reasonable attempt to  correct obvious errors, but I expect that there are errors of grammar and possibly content that I did not discover before finalizing the note.

## 2024-01-04 ENCOUNTER — OFFICE VISIT (OUTPATIENT)
Dept: MEDICAL GROUP | Facility: PHYSICIAN GROUP | Age: 74
End: 2024-01-04
Payer: MEDICARE

## 2024-01-04 VITALS
RESPIRATION RATE: 16 BRPM | WEIGHT: 188.8 LBS | HEIGHT: 68 IN | DIASTOLIC BLOOD PRESSURE: 82 MMHG | HEART RATE: 82 BPM | SYSTOLIC BLOOD PRESSURE: 124 MMHG | TEMPERATURE: 97.7 F | OXYGEN SATURATION: 96 % | BODY MASS INDEX: 28.61 KG/M2

## 2024-01-04 DIAGNOSIS — G89.29 CHRONIC LOW BACK PAIN, UNSPECIFIED BACK PAIN LATERALITY, UNSPECIFIED WHETHER SCIATICA PRESENT: ICD-10-CM

## 2024-01-04 DIAGNOSIS — Z13.0 SCREENING FOR DEFICIENCY ANEMIA: ICD-10-CM

## 2024-01-04 DIAGNOSIS — M54.50 CHRONIC LOW BACK PAIN, UNSPECIFIED BACK PAIN LATERALITY, UNSPECIFIED WHETHER SCIATICA PRESENT: ICD-10-CM

## 2024-01-04 DIAGNOSIS — E78.2 MIXED HYPERLIPIDEMIA: ICD-10-CM

## 2024-01-04 DIAGNOSIS — E89.0 POSTOPERATIVE HYPOTHYROIDISM: ICD-10-CM

## 2024-01-04 PROCEDURE — 3074F SYST BP LT 130 MM HG: CPT | Performed by: INTERNAL MEDICINE

## 2024-01-04 PROCEDURE — 3079F DIAST BP 80-89 MM HG: CPT | Performed by: INTERNAL MEDICINE

## 2024-01-04 PROCEDURE — 99214 OFFICE O/P EST MOD 30 MIN: CPT | Performed by: INTERNAL MEDICINE

## 2024-01-04 RX ORDER — HYDROCODONE BITARTRATE AND ACETAMINOPHEN 5; 325 MG/1; MG/1
1 TABLET ORAL EVERY 8 HOURS PRN
Qty: 30 TABLET | Refills: 0 | Status: SHIPPED | OUTPATIENT
Start: 2024-01-04 | End: 2024-03-06 | Stop reason: SDUPTHER

## 2024-01-04 RX ORDER — METHYLPREDNISOLONE 4 MG/1
TABLET ORAL
Qty: 21 TABLET | Refills: 0 | Status: SHIPPED | OUTPATIENT
Start: 2024-01-04 | End: 2024-01-31

## 2024-01-04 ASSESSMENT — PATIENT HEALTH QUESTIONNAIRE - PHQ9
SUM OF ALL RESPONSES TO PHQ9 QUESTIONS 1 AND 2: 0
8. MOVING OR SPEAKING SO SLOWLY THAT OTHER PEOPLE COULD HAVE NOTICED. OR THE OPPOSITE, BEING SO FIGETY OR RESTLESS THAT YOU HAVE BEEN MOVING AROUND A LOT MORE THAN USUAL: NOT AT ALL
5. POOR APPETITE OR OVEREATING: NOT AT ALL
2. FEELING DOWN, DEPRESSED, IRRITABLE, OR HOPELESS: NOT AT ALL
6. FEELING BAD ABOUT YOURSELF - OR THAT YOU ARE A FAILURE OR HAVE LET YOURSELF OR YOUR FAMILY DOWN: NOT AL ALL
3. TROUBLE FALLING OR STAYING ASLEEP OR SLEEPING TOO MUCH: NOT AT ALL
SUM OF ALL RESPONSES TO PHQ QUESTIONS 1-9: 0
1. LITTLE INTEREST OR PLEASURE IN DOING THINGS: NOT AT ALL
9. THOUGHTS THAT YOU WOULD BE BETTER OFF DEAD, OR OF HURTING YOURSELF: NOT AT ALL
4. FEELING TIRED OR HAVING LITTLE ENERGY: NOT AT ALL
7. TROUBLE CONCENTRATING ON THINGS, SUCH AS READING THE NEWSPAPER OR WATCHING TELEVISION: NOT AT ALL

## 2024-01-04 ASSESSMENT — FIBROSIS 4 INDEX: FIB4 SCORE: 1.18

## 2024-01-18 DIAGNOSIS — J32.9 RECURRENT SINUSITIS: ICD-10-CM

## 2024-01-24 ENCOUNTER — PATIENT MESSAGE (OUTPATIENT)
Dept: MEDICAL GROUP | Facility: PHYSICIAN GROUP | Age: 74
End: 2024-01-24
Payer: MEDICARE

## 2024-01-24 NOTE — PATIENT COMMUNICATION
Patient arrived in office requesting a new/updated medication. Pt. Was informed Dr.Allison Vargas will be on leave until May, but may see another physician to discuss change in medication. Patient declined making an appointment with a different physician in meantime. Pt. Verbalized that she will no longer be seeing  and will be seeking care outside of [facility]. 01/24/2024 @ 15:10

## 2024-01-25 ENCOUNTER — TELEPHONE (OUTPATIENT)
Dept: SCHEDULING | Facility: IMAGING CENTER | Age: 74
End: 2024-01-25

## 2024-01-31 ENCOUNTER — OFFICE VISIT (OUTPATIENT)
Dept: MEDICAL GROUP | Facility: PHYSICIAN GROUP | Age: 74
End: 2024-01-31
Payer: MEDICARE

## 2024-01-31 VITALS
OXYGEN SATURATION: 97 % | RESPIRATION RATE: 16 BRPM | SYSTOLIC BLOOD PRESSURE: 82 MMHG | WEIGHT: 190.5 LBS | HEIGHT: 68 IN | DIASTOLIC BLOOD PRESSURE: 64 MMHG | BODY MASS INDEX: 28.87 KG/M2 | HEART RATE: 67 BPM | TEMPERATURE: 97.7 F

## 2024-01-31 DIAGNOSIS — M25.562 CHRONIC PAIN OF BOTH KNEES: ICD-10-CM

## 2024-01-31 DIAGNOSIS — F33.0 MAJOR DEPRESSIVE DISORDER, RECURRENT EPISODE, MILD (HCC): ICD-10-CM

## 2024-01-31 DIAGNOSIS — J45.20 MILD INTERMITTENT ASTHMA WITHOUT COMPLICATION: ICD-10-CM

## 2024-01-31 DIAGNOSIS — R51.9 NONINTRACTABLE HEADACHE, UNSPECIFIED CHRONICITY PATTERN, UNSPECIFIED HEADACHE TYPE: ICD-10-CM

## 2024-01-31 DIAGNOSIS — G89.29 CHRONIC LOW BACK PAIN, UNSPECIFIED BACK PAIN LATERALITY, UNSPECIFIED WHETHER SCIATICA PRESENT: ICD-10-CM

## 2024-01-31 DIAGNOSIS — M54.50 CHRONIC LOW BACK PAIN, UNSPECIFIED BACK PAIN LATERALITY, UNSPECIFIED WHETHER SCIATICA PRESENT: ICD-10-CM

## 2024-01-31 DIAGNOSIS — M25.561 CHRONIC PAIN OF BOTH KNEES: ICD-10-CM

## 2024-01-31 DIAGNOSIS — G89.29 CHRONIC PAIN OF BOTH KNEES: ICD-10-CM

## 2024-01-31 DIAGNOSIS — Z79.891 CHRONIC USE OF OPIATE FOR THERAPEUTIC PURPOSE: ICD-10-CM

## 2024-01-31 PROCEDURE — 3078F DIAST BP <80 MM HG: CPT | Performed by: PHYSICIAN ASSISTANT

## 2024-01-31 PROCEDURE — 3074F SYST BP LT 130 MM HG: CPT | Performed by: PHYSICIAN ASSISTANT

## 2024-01-31 PROCEDURE — 99214 OFFICE O/P EST MOD 30 MIN: CPT | Performed by: PHYSICIAN ASSISTANT

## 2024-01-31 RX ORDER — FLUTICASONE FUROATE AND VILANTEROL 100; 25 UG/1; UG/1
1 POWDER RESPIRATORY (INHALATION) DAILY
Qty: 3 EACH | Refills: 3 | Status: SHIPPED | OUTPATIENT
Start: 2024-01-31

## 2024-01-31 ASSESSMENT — ENCOUNTER SYMPTOMS
HEADACHES: 1
CHILLS: 0
SHORTNESS OF BREATH: 0
FEVER: 0

## 2024-01-31 ASSESSMENT — FIBROSIS 4 INDEX: FIB4 SCORE: 1.18

## 2024-01-31 NOTE — PROGRESS NOTES
"Subjective:     CC: medication refill    HPI:   Sola, patient of Dr. Vargas,  presents today with the following:    Problem   Chronic Use of Opiate for Therapeutic Purpose    UDS consistent 10/2023  Consent form signed 10/2023.     Headache    Chronic, uncontrolled.  Intermittent since Thanksgiving 2023.  Daily, some days worse than other.  Sneezing helps.  Pain in the temple area.  Tried and didn't help: sudafed, claritin, sinus rinses.  No change with doxycycline, augmentin, medrol dose pack.  History of sinus surgery 30 years ago.     Chronic Lower Back Pain    Chronic, uncontrolled.  Tolerates hydrocodone/acetaminophen 5/325 as needed.  Does not need a refill at this time.     Chronic Pain of Both Knees    Chronic, uncontrolled.  Tolerates hydrocodone/acetaminophen 5/325 as needed.  Does not need a refill at this time.     Major Depressive Disorder, Recurrent Episode, Mild (Hcc)    Chronic, controlled.  Tolerating/continue lexapro 20mg daily.     Asthma    Chronic, controlled.  Changing to Breo from Symbicort due to insurance.  Tolerating albuterol when needed but doesn't need it very often.                  ROS:  Review of Systems   Constitutional:  Negative for chills and fever.   Respiratory:  Negative for shortness of breath.    Cardiovascular:  Negative for chest pain.   Neurological:  Positive for headaches.       Objective:     Exam:  BP (!) 82/64 (BP Location: Left arm, Patient Position: Sitting, BP Cuff Size: Adult)   Pulse 67   Temp 36.5 °C (97.7 °F) (Temporal)   Resp 16   Ht 1.727 m (5' 8\")   Wt 86.4 kg (190 lb 8 oz)   SpO2 97%   BMI 28.97 kg/m²  Body mass index is 28.97 kg/m².    Physical Exam  Vitals reviewed.   Constitutional:       General: She is not in acute distress.     Appearance: Normal appearance.   HENT:      Head:      Comments: Blood vessel is ever so slightly palpable over the right temple which is nontender to palpation.     Ears:      Comments: TM retracted " bilaterally.  Pulmonary:      Effort: Pulmonary effort is normal.   Neurological:      General: No focal deficit present.      Mental Status: She is alert.   Psychiatric:         Mood and Affect: Mood normal.         Behavior: Behavior normal.         Judgment: Judgment normal.                Assessment & Plan by Problem:         1. Mild intermittent asthma without complication  Chronic, stable.  Changing daily inhaler to Breo per insurance formulary.    2. Nonintractable headache, unspecified chronicity pattern, unspecified headache type  Chronic, uncontrolled.  Suspect sinus.  Start Nasonex, OTC nose spray.  Try chlorpeniramine tabs as well.  Has follow-up with ENT next month.    3. Major depressive disorder, recurrent episode, mild (HCC)  Chronic, stable.  Continue Lexapro 20 mg daily.    4. Chronic use of opiate for therapeutic purpose  5. Chronic low back pain, unspecified back pain laterality, unspecified whether sciatica present  6. Chronic pain of both knees  Chronic, uncontrolled but stable.  Moderating hydrocodone/acetaminophen 5/325 as needed.  Does not need a very often.  Does not need a refill at this time.    Has follow up with ENT next month.    Patient's blood pressure is low but she is asymptomatic.    Labs ordered by Dr. Vargas 1/4/2024:      PDMP review shows no signs of medication abuse of misuse:      Return if symptoms worsen or fail to improve, for lab discussion.      HCC Gap Form    Last edited 01/31/24 10:13 PST by Jen Nassar P.A.-C.             Please note that this dictation was created using voice recognition software. I have made every reasonable attempt to correct obvious errors, but I expect that there are errors of grammar and possibly content that I did not discover before finalizing the note.

## 2024-02-13 ENCOUNTER — OFFICE VISIT (OUTPATIENT)
Dept: URGENT CARE | Facility: PHYSICIAN GROUP | Age: 74
End: 2024-02-13
Payer: MEDICARE

## 2024-02-13 VITALS
DIASTOLIC BLOOD PRESSURE: 60 MMHG | HEIGHT: 68 IN | WEIGHT: 189.6 LBS | SYSTOLIC BLOOD PRESSURE: 118 MMHG | OXYGEN SATURATION: 96 % | HEART RATE: 74 BPM | RESPIRATION RATE: 16 BRPM | TEMPERATURE: 97.1 F | BODY MASS INDEX: 28.73 KG/M2

## 2024-02-13 DIAGNOSIS — R51.9 NONINTRACTABLE HEADACHE, UNSPECIFIED CHRONICITY PATTERN, UNSPECIFIED HEADACHE TYPE: ICD-10-CM

## 2024-02-13 PROCEDURE — 3074F SYST BP LT 130 MM HG: CPT | Performed by: FAMILY MEDICINE

## 2024-02-13 PROCEDURE — 99213 OFFICE O/P EST LOW 20 MIN: CPT | Performed by: FAMILY MEDICINE

## 2024-02-13 PROCEDURE — 3078F DIAST BP <80 MM HG: CPT | Performed by: FAMILY MEDICINE

## 2024-02-13 ASSESSMENT — FIBROSIS 4 INDEX: FIB4 SCORE: 1.18

## 2024-02-13 NOTE — PROGRESS NOTES
"  Subjective:      73 y.o. female presents to urgent care for headache that has been present since Thanksgiving 2023.  The headache has been constant since then, although does change locations.  Currently it is located in her right forehead, it is described as pressure-like, currently rated 5/10 but at its worst is 10/10.  She has tried Tylenol, ibuprofen, Benadryl, 2 courses of antibiotics, and a course of prednisone with only moderate relief in symptoms.  She noticed a bump to her right forehead this morning, there was no head trauma or injury.    She denies any other questions or concerns at this time.    Current problem list, medication, and past medical/surgical history were reviewed in Epic.    ROS  See HPI     Objective:      /60 (BP Location: Left arm, Patient Position: Sitting, BP Cuff Size: Adult)   Pulse 74   Temp 36.2 °C (97.1 °F) (Temporal)   Resp 16   Ht 1.727 m (5' 8\")   Wt 86 kg (189 lb 9.5 oz)   SpO2 96%   BMI 28.83 kg/m²     Physical Exam  Constitutional:       General: She is not in acute distress.     Appearance: She is not diaphoretic.   HENT:      Right Ear: Tympanic membrane, ear canal and external ear normal.      Left Ear: Tympanic membrane, ear canal and external ear normal.   Eyes:      General: No scleral icterus.        Right eye: No discharge.         Left eye: No discharge.      Extraocular Movements: Extraocular movements intact.      Conjunctiva/sclera: Conjunctivae normal.      Pupils: Pupils are equal, round, and reactive to light.   Cardiovascular:      Rate and Rhythm: Normal rate and regular rhythm.      Heart sounds: Normal heart sounds.   Pulmonary:      Effort: Pulmonary effort is normal. No respiratory distress.      Breath sounds: Normal breath sounds.   Skin:     Comments: Small, palpable mass to her right forehead, no overlying skin changes.   Neurological:      General: No focal deficit present.      Mental Status: She is alert and oriented to person, place, " and time. Mental status is at baseline.      Comments: Cranial nerves II through XII grossly intact.  Equal strength and sensation to extremities x 4.  Normal rapid alternating hand movement.  Normal gait.   Psychiatric:         Mood and Affect: Affect normal.         Judgment: Judgment normal.       Assessment/Plan:     1. Nonintractable headache, unspecified chronicity pattern, unspecified headache type  She has an upcoming appointment with ENT.  I have also placed a referral to neurology.  Tylenol and ibuprofen as needed.  - Referral to Neurology      Instructed to return to Urgent Care or nearest Emergency Department if symptoms fail to improve, for any change in condition, further concerns, or new concerning symptoms. Patient states understanding of the plan of care and discharge instructions.    Magnolia Rowe M.D.

## 2024-02-26 ENCOUNTER — TELEPHONE (OUTPATIENT)
Dept: HEALTH INFORMATION MANAGEMENT | Facility: OTHER | Age: 74
End: 2024-02-26
Payer: MEDICARE

## 2024-03-06 ENCOUNTER — OFFICE VISIT (OUTPATIENT)
Dept: MEDICAL GROUP | Facility: PHYSICIAN GROUP | Age: 74
End: 2024-03-06
Payer: MEDICARE

## 2024-03-06 VITALS
TEMPERATURE: 98.8 F | OXYGEN SATURATION: 96 % | DIASTOLIC BLOOD PRESSURE: 64 MMHG | BODY MASS INDEX: 29.05 KG/M2 | WEIGHT: 191.7 LBS | SYSTOLIC BLOOD PRESSURE: 90 MMHG | HEIGHT: 68 IN | HEART RATE: 89 BPM | RESPIRATION RATE: 16 BRPM

## 2024-03-06 DIAGNOSIS — R53.83 FATIGUE, UNSPECIFIED TYPE: ICD-10-CM

## 2024-03-06 DIAGNOSIS — S60.511A: ICD-10-CM

## 2024-03-06 DIAGNOSIS — M54.50 CHRONIC LOW BACK PAIN, UNSPECIFIED BACK PAIN LATERALITY, UNSPECIFIED WHETHER SCIATICA PRESENT: ICD-10-CM

## 2024-03-06 DIAGNOSIS — Z23 NEED FOR VACCINATION: ICD-10-CM

## 2024-03-06 DIAGNOSIS — Z79.891 CHRONIC USE OF OPIATE FOR THERAPEUTIC PURPOSE: ICD-10-CM

## 2024-03-06 DIAGNOSIS — M25.561 CHRONIC PAIN OF BOTH KNEES: ICD-10-CM

## 2024-03-06 DIAGNOSIS — M25.562 CHRONIC PAIN OF BOTH KNEES: ICD-10-CM

## 2024-03-06 DIAGNOSIS — G89.29 CHRONIC PAIN OF BOTH KNEES: ICD-10-CM

## 2024-03-06 DIAGNOSIS — E89.0 POSTOPERATIVE HYPOTHYROIDISM: ICD-10-CM

## 2024-03-06 DIAGNOSIS — G89.29 CHRONIC LOW BACK PAIN, UNSPECIFIED BACK PAIN LATERALITY, UNSPECIFIED WHETHER SCIATICA PRESENT: ICD-10-CM

## 2024-03-06 DIAGNOSIS — E78.2 MIXED HYPERLIPIDEMIA: ICD-10-CM

## 2024-03-06 PROCEDURE — 99214 OFFICE O/P EST MOD 30 MIN: CPT | Mod: 25 | Performed by: PHYSICIAN ASSISTANT

## 2024-03-06 PROCEDURE — 3074F SYST BP LT 130 MM HG: CPT | Performed by: PHYSICIAN ASSISTANT

## 2024-03-06 PROCEDURE — 90715 TDAP VACCINE 7 YRS/> IM: CPT | Performed by: PHYSICIAN ASSISTANT

## 2024-03-06 PROCEDURE — 3078F DIAST BP <80 MM HG: CPT | Performed by: PHYSICIAN ASSISTANT

## 2024-03-06 PROCEDURE — 90471 IMMUNIZATION ADMIN: CPT | Performed by: PHYSICIAN ASSISTANT

## 2024-03-06 RX ORDER — HYDROCODONE BITARTRATE AND ACETAMINOPHEN 5; 325 MG/1; MG/1
1 TABLET ORAL EVERY 8 HOURS PRN
Qty: 30 TABLET | Refills: 0 | Status: SHIPPED | OUTPATIENT
Start: 2024-03-06 | End: 2024-04-05

## 2024-03-06 ASSESSMENT — ENCOUNTER SYMPTOMS
SHORTNESS OF BREATH: 0
CHILLS: 0
FEVER: 0

## 2024-03-06 ASSESSMENT — FIBROSIS 4 INDEX: FIB4 SCORE: 1.18

## 2024-03-06 NOTE — ASSESSMENT & PLAN NOTE
Chronic, uncontrolled.  Tolerates hydrocodone/acetaminophen 5/325 as needed.  Does not take it every day.  #30 tabs last filled 1/4/2024.  UDS consistent 10/2023   Consent form signed 10/2023.

## 2024-03-06 NOTE — PROGRESS NOTES
SUBJECTIVE:     CC: pain meds    HPI:   Sola, patient of Dr. Vargas, presents today with the following:    ASSESSMENT & PLAN by Problem:       Problem List Items Addressed This Visit       Chronic lower back pain     Chronic, uncontrolled.  Tolerates hydrocodone/acetaminophen 5/325 as needed.  Does not take it every day.  #30 tabs last filled 1/4/2024.  UDS consistent 10/2023   Consent form signed 10/2023.         Relevant Medications    HYDROcodone-acetaminophen (NORCO) 5-325 MG Tab per tablet    Chronic pain of both knees     Chronic, uncontrolled.  Tolerates hydrocodone/acetaminophen 5/325 as needed.  Does not take it every day.  #30 tabs last filled 1/4/2024.  UDS consistent 10/2023   Consent form signed 10/2023.         Relevant Medications    HYDROcodone-acetaminophen (NORCO) 5-325 MG Tab per tablet    Chronic use of opiate for therapeutic purpose     UDS consistent 10/2023  Consent form signed 10/2023.         Mixed hyperlipidemia     Chronic, controlled.  Tolerating/continue rosuvastatin 5 mg daily.         Relevant Orders    Lipid Profile    Postoperative hypothyroidism     Chronic, stable.  Tolerating/continue levothyroxine 100 mcg daily and liothyronine 5 mcg daily.         Relevant Orders    TSH    T3 FREE    FREE THYROXINE     Other Visit Diagnoses       Fatigue, unspecified type        Relevant Orders    CBC WITH DIFFERENTIAL    Comp Metabolic Panel    Scratch of hand, right, initial encounter        Relevant Orders    Tdap =>8yo IM (Completed)    Need for vaccination        Relevant Orders    Tdap =>8yo IM (Completed)            Labs ordered by Dr. Vargas 1/4/2024, advised to be drawn around April 2024:      Return for lab discussion. In April    PMDP review shows no signs of medication abuse of misuse:          HPI:     Problem   Chronic Use of Opiate for Therapeutic Purpose    UDS consistent 10/2023   Consent form signed 10/2023.     Mixed Hyperlipidemia    Chronic, controlled.    Latest Labs:  "  Lab Results   Component Value Date/Time    CHOLSTRLTOT 134 08/07/2023 10:38 AM    LDL 61 08/07/2023 10:38 AM    HDL 50 08/07/2023 10:38 AM    TRIGLYCERIDE 115 08/07/2023 10:38 AM      Medications: tolerating rosuvastatin 5 mg daily    Risk calculator: The 10-year ASCVD risk score (Anthony GALLARDO, et al., 2019) is: 7.9%        Chronic Lower Back Pain    Chronic, uncontrolled.  Tolerates hydrocodone/acetaminophen 5/325 as needed.  Does not take it every day.  #30 tabs last filled 1/4/2024.  UDS consistent 10/2023   Consent form signed 10/2023.     Chronic Pain of Both Knees    Chronic, uncontrolled.  Tolerates hydrocodone/acetaminophen 5/325 as needed.  Does not take it every day.  #30 tabs last filled 1/4/2024.  UDS consistent 10/2023   Consent form signed 10/2023.        Postoperative Hypothyroidism    Chronic, stable.  Tolerating/continue levothyroxine 100 mcg daily and liothyronine 5 mcg daily.                ROS:  Review of Systems   Constitutional:  Negative for chills and fever.   Respiratory:  Negative for shortness of breath.    Cardiovascular:  Negative for chest pain.       OBJECTIVE:     Exam:  BP 90/64 (BP Location: Right arm, Patient Position: Sitting, BP Cuff Size: Adult)   Pulse 89   Temp 37.1 °C (98.8 °F) (Temporal)   Resp 16   Ht 1.727 m (5' 8\")   Wt 87 kg (191 lb 11.2 oz)   SpO2 96%   BMI 29.15 kg/m²  Body mass index is 29.15 kg/m².    Physical Exam  Vitals reviewed.   Constitutional:       General: She is not in acute distress.     Appearance: Normal appearance.   Pulmonary:      Effort: Pulmonary effort is normal.   Musculoskeletal:      Comments: Healing scratch noted on the dorsum of the right hand without signs of infection.   Neurological:      General: No focal deficit present.      Mental Status: She is alert.   Psychiatric:         Mood and Affect: Mood normal.         Behavior: Behavior normal.         Judgment: Judgment normal.       Consequences of Chronic Opiate therapy:  (5 " A's)  Analgesia:  significantly improved  Activity:  significantly improved  Adverse Events:  none  Aberrant Behaviors:  none  Affect/Mood: good grooming, full facial expressions, normal speech pattern and content, normal thought patterns, normal perception, good insight, normal reasoning  Last CMP:   8/7/2023, normal renal/hepatic function  Appropriate Imaging done:   nothing in our system since 2022; will discuss at follow up           Please note that this dictation was created using voice recognition software. I have made every reasonable attempt to correct obvious errors, but I expect that there are errors of grammar and possibly content that I did not discover before finalizing the note.

## 2024-03-21 RX ORDER — AMOXICILLIN AND CLAVULANATE POTASSIUM 875; 125 MG/1; MG/1
1 TABLET, FILM COATED ORAL 2 TIMES DAILY
Qty: 14 TABLET | Refills: 0 | OUTPATIENT
Start: 2024-03-21

## 2024-04-03 ENCOUNTER — APPOINTMENT (RX ONLY)
Dept: URBAN - METROPOLITAN AREA CLINIC 36 | Facility: CLINIC | Age: 74
Setting detail: DERMATOLOGY
End: 2024-04-03

## 2024-04-03 DIAGNOSIS — Z41.9 ENCOUNTER FOR PROCEDURE FOR PURPOSES OTHER THAN REMEDYING HEALTH STATE, UNSPECIFIED: ICD-10-CM

## 2024-04-03 PROCEDURE — ? BOTOX

## 2024-04-03 NOTE — PROCEDURE: BOTOX
Show Mentalis Units: No
Glabellar Complex Units: 0
Show Nasal Units: Yes
Depressor Anguli Oris Units: 8
Additional Area 2 Location: chin
Periorbital Skin Units: 42
Incrementing Botox Units: By 0.5 Units
Additional Area 1 Location: upper lip
Expiration Date (Month Year): 09/2026
Lot #: [id number]
Consent: Written consent obtained. Risks include but not limited to lid/brow ptosis, bruising, swelling, diplopia, temporary effect, incomplete chemical denervation.
Detail Level: Detailed
Additional Area 3 Location: masseter
Dilution (U/0.1 Cc): 0.6
Post-Care Instructions: Patient instructed to not lie down for 4 hours and limit physical activity for 24 hours. Patient instructed not to travel by airplane for 48 hours.

## 2024-04-13 ENCOUNTER — HOSPITAL ENCOUNTER (OUTPATIENT)
Dept: LAB | Facility: MEDICAL CENTER | Age: 74
End: 2024-04-13
Attending: PHYSICIAN ASSISTANT
Payer: MEDICARE

## 2024-04-13 DIAGNOSIS — E89.0 POSTOPERATIVE HYPOTHYROIDISM: ICD-10-CM

## 2024-04-13 DIAGNOSIS — R53.83 FATIGUE, UNSPECIFIED TYPE: ICD-10-CM

## 2024-04-13 DIAGNOSIS — E78.2 MIXED HYPERLIPIDEMIA: ICD-10-CM

## 2024-04-13 LAB
ALBUMIN SERPL BCP-MCNC: 4.3 G/DL (ref 3.2–4.9)
ALBUMIN/GLOB SERPL: 1.3 G/DL
ALP SERPL-CCNC: 89 U/L (ref 30–99)
ALT SERPL-CCNC: 18 U/L (ref 2–50)
ANION GAP SERPL CALC-SCNC: 11 MMOL/L (ref 7–16)
AST SERPL-CCNC: 16 U/L (ref 12–45)
BASOPHILS # BLD AUTO: 0.9 % (ref 0–1.8)
BASOPHILS # BLD: 0.08 K/UL (ref 0–0.12)
BILIRUB SERPL-MCNC: 0.4 MG/DL (ref 0.1–1.5)
BUN SERPL-MCNC: 16 MG/DL (ref 8–22)
CALCIUM ALBUM COR SERPL-MCNC: 9.4 MG/DL (ref 8.5–10.5)
CALCIUM SERPL-MCNC: 9.6 MG/DL (ref 8.5–10.5)
CHLORIDE SERPL-SCNC: 105 MMOL/L (ref 96–112)
CHOLEST SERPL-MCNC: 145 MG/DL (ref 100–199)
CO2 SERPL-SCNC: 24 MMOL/L (ref 20–33)
CREAT SERPL-MCNC: 0.71 MG/DL (ref 0.5–1.4)
EOSINOPHIL # BLD AUTO: 0.41 K/UL (ref 0–0.51)
EOSINOPHIL NFR BLD: 4.7 % (ref 0–6.9)
ERYTHROCYTE [DISTWIDTH] IN BLOOD BY AUTOMATED COUNT: 45.2 FL (ref 35.9–50)
FASTING STATUS PATIENT QL REPORTED: NORMAL
GFR SERPLBLD CREATININE-BSD FMLA CKD-EPI: 90 ML/MIN/1.73 M 2
GLOBULIN SER CALC-MCNC: 3.4 G/DL (ref 1.9–3.5)
GLUCOSE SERPL-MCNC: 107 MG/DL (ref 65–99)
HCT VFR BLD AUTO: 50.9 % (ref 37–47)
HDLC SERPL-MCNC: 49 MG/DL
HGB BLD-MCNC: 16.4 G/DL (ref 12–16)
IMM GRANULOCYTES # BLD AUTO: 0.03 K/UL (ref 0–0.11)
IMM GRANULOCYTES NFR BLD AUTO: 0.3 % (ref 0–0.9)
LDLC SERPL CALC-MCNC: 76 MG/DL
LYMPHOCYTES # BLD AUTO: 2.97 K/UL (ref 1–4.8)
LYMPHOCYTES NFR BLD: 33.9 % (ref 22–41)
MCH RBC QN AUTO: 31.4 PG (ref 27–33)
MCHC RBC AUTO-ENTMCNC: 32.2 G/DL (ref 32.2–35.5)
MCV RBC AUTO: 97.3 FL (ref 81.4–97.8)
MONOCYTES # BLD AUTO: 0.49 K/UL (ref 0–0.85)
MONOCYTES NFR BLD AUTO: 5.6 % (ref 0–13.4)
NEUTROPHILS # BLD AUTO: 4.78 K/UL (ref 1.82–7.42)
NEUTROPHILS NFR BLD: 54.6 % (ref 44–72)
NRBC # BLD AUTO: 0 K/UL
NRBC BLD-RTO: 0 /100 WBC (ref 0–0.2)
PLATELET # BLD AUTO: 278 K/UL (ref 164–446)
PMV BLD AUTO: 11.2 FL (ref 9–12.9)
POTASSIUM SERPL-SCNC: 4.6 MMOL/L (ref 3.6–5.5)
PROT SERPL-MCNC: 7.7 G/DL (ref 6–8.2)
RBC # BLD AUTO: 5.23 M/UL (ref 4.2–5.4)
SODIUM SERPL-SCNC: 140 MMOL/L (ref 135–145)
T3FREE SERPL-MCNC: 3.37 PG/ML (ref 2–4.4)
T4 FREE SERPL-MCNC: 1.35 NG/DL (ref 0.93–1.7)
TRIGL SERPL-MCNC: 102 MG/DL (ref 0–149)
TSH SERPL DL<=0.005 MIU/L-ACNC: 0.87 UIU/ML (ref 0.38–5.33)
WBC # BLD AUTO: 8.8 K/UL (ref 4.8–10.8)

## 2024-04-13 PROCEDURE — 84443 ASSAY THYROID STIM HORMONE: CPT

## 2024-04-13 PROCEDURE — 80061 LIPID PANEL: CPT

## 2024-04-13 PROCEDURE — 84439 ASSAY OF FREE THYROXINE: CPT

## 2024-04-13 PROCEDURE — 84481 FREE ASSAY (FT-3): CPT

## 2024-04-13 PROCEDURE — 80053 COMPREHEN METABOLIC PANEL: CPT

## 2024-04-13 PROCEDURE — 85025 COMPLETE CBC W/AUTO DIFF WBC: CPT

## 2024-04-13 PROCEDURE — 36415 COLL VENOUS BLD VENIPUNCTURE: CPT

## 2024-04-15 RX ORDER — POLYETHYLENE GLYCOL 3350, SODIUM SULFATE ANHYDROUS, SODIUM BICARBONATE, SODIUM CHLORIDE, POTASSIUM CHLORIDE 236; 22.74; 6.74; 5.86; 2.97 G/4L; G/4L; G/4L; G/4L; G/4L
POWDER, FOR SOLUTION ORAL
COMMUNITY
Start: 2024-03-25

## 2024-04-16 ENCOUNTER — OFFICE VISIT (OUTPATIENT)
Dept: MEDICAL GROUP | Facility: PHYSICIAN GROUP | Age: 74
End: 2024-04-16
Payer: MEDICARE

## 2024-04-16 VITALS
RESPIRATION RATE: 16 BRPM | BODY MASS INDEX: 29.31 KG/M2 | OXYGEN SATURATION: 94 % | HEART RATE: 74 BPM | SYSTOLIC BLOOD PRESSURE: 100 MMHG | WEIGHT: 193.4 LBS | HEIGHT: 68 IN | DIASTOLIC BLOOD PRESSURE: 64 MMHG | TEMPERATURE: 97.5 F

## 2024-04-16 DIAGNOSIS — R73.01 IFG (IMPAIRED FASTING GLUCOSE): ICD-10-CM

## 2024-04-16 DIAGNOSIS — R71.8 OTHER ABNORMALITY OF RED BLOOD CELLS: ICD-10-CM

## 2024-04-16 DIAGNOSIS — M25.561 CHRONIC PAIN OF BOTH KNEES: ICD-10-CM

## 2024-04-16 DIAGNOSIS — M25.562 CHRONIC PAIN OF BOTH KNEES: ICD-10-CM

## 2024-04-16 DIAGNOSIS — D75.1 POLYCYTHEMIA: ICD-10-CM

## 2024-04-16 DIAGNOSIS — F17.210 CIGARETTE NICOTINE DEPENDENCE WITHOUT COMPLICATION: ICD-10-CM

## 2024-04-16 DIAGNOSIS — M54.50 CHRONIC LOW BACK PAIN, UNSPECIFIED BACK PAIN LATERALITY, UNSPECIFIED WHETHER SCIATICA PRESENT: ICD-10-CM

## 2024-04-16 DIAGNOSIS — G89.29 CHRONIC PAIN OF BOTH KNEES: ICD-10-CM

## 2024-04-16 DIAGNOSIS — G89.29 CHRONIC LOW BACK PAIN, UNSPECIFIED BACK PAIN LATERALITY, UNSPECIFIED WHETHER SCIATICA PRESENT: ICD-10-CM

## 2024-04-16 DIAGNOSIS — R79.89 OTHER SPECIFIED ABNORMAL FINDINGS OF BLOOD CHEMISTRY: ICD-10-CM

## 2024-04-16 DIAGNOSIS — R53.83 FATIGUE, UNSPECIFIED TYPE: ICD-10-CM

## 2024-04-16 DIAGNOSIS — Z79.891 CHRONIC USE OF OPIATE FOR THERAPEUTIC PURPOSE: ICD-10-CM

## 2024-04-16 PROCEDURE — 3078F DIAST BP <80 MM HG: CPT | Performed by: PHYSICIAN ASSISTANT

## 2024-04-16 PROCEDURE — 99214 OFFICE O/P EST MOD 30 MIN: CPT | Performed by: PHYSICIAN ASSISTANT

## 2024-04-16 PROCEDURE — 3074F SYST BP LT 130 MM HG: CPT | Performed by: PHYSICIAN ASSISTANT

## 2024-04-16 RX ORDER — HYDROCODONE BITARTRATE AND ACETAMINOPHEN 5; 325 MG/1; MG/1
1 TABLET ORAL
Qty: 90 TABLET | Refills: 0 | Status: SHIPPED | OUTPATIENT
Start: 2024-04-16 | End: 2024-07-15

## 2024-04-16 ASSESSMENT — ENCOUNTER SYMPTOMS
CHILLS: 0
FEVER: 0
SHORTNESS OF BREATH: 0

## 2024-04-16 ASSESSMENT — FIBROSIS 4 INDEX: FIB4 SCORE: 0.96

## 2024-04-16 NOTE — ASSESSMENT & PLAN NOTE
Chronic, uncontrolled.  Tolerates hydrocodone/acetaminophen 5/325 as needed.  Does not take it every day feels like she could.  Sending over 30 tabs daily as she is trying to be more active.  UDS consistent 10/2023   Consent form signed 10/2023.

## 2024-04-16 NOTE — PROGRESS NOTES
SUBJECTIVE:     CC: lab follow up     HPI:   Sola, patient of Dr. Vargas, presents today with the following:    ASSESSMENT & PLAN by Problem:       Problem List Items Addressed This Visit       Chronic lower back pain     Chronic, uncontrolled.  Tolerates hydrocodone/acetaminophen 5/325 as needed.  Does not take it every day feels like she could.  Sending over 30 tabs daily as she is trying to be more active.  UDS consistent 10/2023   Consent form signed 10/2023.         Relevant Medications    HYDROcodone-acetaminophen (NORCO) 5-325 MG Tab per tablet    Chronic pain of both knees     Chronic, uncontrolled.  Tolerates hydrocodone/acetaminophen 5/325 as needed.  Does not take it every day feels like she could.  Sending over 30 tabs daily as she is trying to be more active.  UDS consistent 10/2023   Consent form signed 10/2023.         Relevant Medications    HYDROcodone-acetaminophen (NORCO) 5-325 MG Tab per tablet    Chronic use of opiate for therapeutic purpose    Relevant Medications    HYDROcodone-acetaminophen (NORCO) 5-325 MG Tab per tablet    IFG (impaired fasting glucose)     Chronic, uncontrolled.  Mildly elevated.  107 (4/2024)  Checking A1C.         Relevant Orders    HEMOGLOBIN A1C    Nicotine dependence     Chronic, uncontrolled.  Still smokes 1/2 ppd.  Wants to quit.  Looking into a program.         Polycythemia     Chronic, uncontrolled.  Patient smokes 1/2 ppd.  Checking additional labs in the fall.         Relevant Orders    CBC WITH DIFFERENTIAL    FERRITIN    IRON/TOTAL IRON BIND    ERYTHROPOIETIN    JAK2 GENE MUT RFLX CALR RFLX MPL     Other Visit Diagnoses       Other specified abnormal findings of blood chemistry        Relevant Orders    FERRITIN    Other abnormality of red blood cells        Relevant Orders    IRON/TOTAL IRON BIND    Fatigue, unspecified type        Relevant Orders    VITAMIN B12              Repeat labs in the fall.    Return in about 3 months (around 7/16/2024) for  "Medication refill.        Healthcare Maintenance:      HPI:     Problem   Polycythemia    Chronic, uncontrolled.  Component      Latest Ref Rng 4/13/2024   Hemoglobin      12.0 - 16.0 g/dL 16.4 (H)    Hematocrit      37.0 - 47.0 % 50.9 (H)    Patient smokes 1/2 ppd.  Checking additional labs.     Ifg (Impaired Fasting Glucose)    Chronic, uncontrolled.  Mildly elevated.  107 (4/2024)  Checking A1C.     Nicotine Dependence    Chronic, uncontrolled.  Still smokes 1/2 ppd.  Wants to quit.  Looking into a program.     Chronic Lower Back Pain    Chronic, uncontrolled.  Tolerates hydrocodone/acetaminophen 5/325 as needed.  Does not take it every day feels like she could.  Sending over 30 tabs daily as she is trying to be more active.  UDS consistent 10/2023   Consent form signed 10/2023.     Chronic Pain of Both Knees    Chronic, uncontrolled.  Tolerates hydrocodone/acetaminophen 5/325 as needed.  Does not take it every day.  #30 tabs last filled 1/4/2024.  Tries to make 30 tabs last 90 days but could use more due to pain.  Really feels 1/day would be most helpful - wants to be active, work in the yard, etc.  UDS consistent 10/2023   Consent form signed 10/2023.                   ROS:  Review of Systems   Constitutional:  Negative for chills and fever.   Respiratory:  Negative for shortness of breath.    Cardiovascular:  Negative for chest pain.       OBJECTIVE:     Exam:  /64 (BP Location: Right arm, Patient Position: Sitting, BP Cuff Size: Large adult)   Pulse 74   Temp 36.4 °C (97.5 °F) (Temporal)   Resp 16   Ht 1.727 m (5' 8\")   Wt 87.7 kg (193 lb 6.4 oz)   SpO2 94%   BMI 29.41 kg/m²  Body mass index is 29.41 kg/m².    Physical Exam  Vitals reviewed.   Constitutional:       General: She is not in acute distress.     Appearance: Normal appearance.   Pulmonary:      Effort: Pulmonary effort is normal.   Neurological:      General: No focal deficit present.      Mental Status: She is alert.   Psychiatric:    "      Mood and Affect: Mood normal.         Behavior: Behavior normal.         Judgment: Judgment normal.           CHART REVIEW:     Labs:                       Please note that this dictation was created using voice recognition software. I have made every reasonable attempt to correct obvious errors, but I expect that there are errors of grammar and possibly content that I did not discover before finalizing the note.

## 2024-04-30 DIAGNOSIS — F41.9 ANXIETY: ICD-10-CM

## 2024-04-30 DIAGNOSIS — E89.0 POSTOPERATIVE HYPOTHYROIDISM: ICD-10-CM

## 2024-04-30 NOTE — TELEPHONE ENCOUNTER
Received request via: Pharmacy    Was the patient seen in the last year in this department? Yes    Does the patient have an active prescription (recently filled or refills available) for medication(s) requested? No    Pharmacy Name: [facility]     Does the patient have [facility] and need 100 day supply (blood pressure, diabetes and cholesterol meds only)? Patient does not have SCP

## 2024-05-04 RX ORDER — LEVOTHYROXINE SODIUM 0.1 MG/1
TABLET ORAL
Qty: 90 TABLET | Refills: 0 | Status: SHIPPED | OUTPATIENT
Start: 2024-05-04

## 2024-05-04 RX ORDER — LIOTHYRONINE SODIUM 5 UG/1
TABLET ORAL
Qty: 90 TABLET | Refills: 0 | Status: SHIPPED | OUTPATIENT
Start: 2024-05-04

## 2024-05-04 RX ORDER — ESCITALOPRAM OXALATE 20 MG/1
TABLET ORAL
Qty: 90 TABLET | Refills: 0 | Status: SHIPPED | OUTPATIENT
Start: 2024-05-04

## 2024-06-07 SDOH — ECONOMIC STABILITY: FOOD INSECURITY: WITHIN THE PAST 12 MONTHS, YOU WORRIED THAT YOUR FOOD WOULD RUN OUT BEFORE YOU GOT MONEY TO BUY MORE.: NEVER TRUE

## 2024-06-07 SDOH — HEALTH STABILITY: MENTAL HEALTH
STRESS IS WHEN SOMEONE FEELS TENSE, NERVOUS, ANXIOUS, OR CAN'T SLEEP AT NIGHT BECAUSE THEIR MIND IS TROUBLED. HOW STRESSED ARE YOU?: NOT AT ALL

## 2024-06-07 SDOH — HEALTH STABILITY: PHYSICAL HEALTH: ON AVERAGE, HOW MANY MINUTES DO YOU ENGAGE IN EXERCISE AT THIS LEVEL?: 0 MIN

## 2024-06-07 SDOH — ECONOMIC STABILITY: HOUSING INSECURITY
IN THE LAST 12 MONTHS, WAS THERE A TIME WHEN YOU DID NOT HAVE A STEADY PLACE TO SLEEP OR SLEPT IN A SHELTER (INCLUDING NOW)?: NO

## 2024-06-07 SDOH — ECONOMIC STABILITY: HOUSING INSECURITY: IN THE LAST 12 MONTHS, HOW MANY PLACES HAVE YOU LIVED?: 1

## 2024-06-07 SDOH — ECONOMIC STABILITY: TRANSPORTATION INSECURITY
IN THE PAST 12 MONTHS, HAS LACK OF RELIABLE TRANSPORTATION KEPT YOU FROM MEDICAL APPOINTMENTS, MEETINGS, WORK OR FROM GETTING THINGS NEEDED FOR DAILY LIVING?: NO

## 2024-06-07 SDOH — HEALTH STABILITY: PHYSICAL HEALTH: ON AVERAGE, HOW MANY DAYS PER WEEK DO YOU ENGAGE IN MODERATE TO STRENUOUS EXERCISE (LIKE A BRISK WALK)?: 0 DAYS

## 2024-06-07 SDOH — ECONOMIC STABILITY: TRANSPORTATION INSECURITY
IN THE PAST 12 MONTHS, HAS THE LACK OF TRANSPORTATION KEPT YOU FROM MEDICAL APPOINTMENTS OR FROM GETTING MEDICATIONS?: NO

## 2024-06-07 SDOH — ECONOMIC STABILITY: INCOME INSECURITY: IN THE LAST 12 MONTHS, WAS THERE A TIME WHEN YOU WERE NOT ABLE TO PAY THE MORTGAGE OR RENT ON TIME?: NO

## 2024-06-07 SDOH — ECONOMIC STABILITY: FOOD INSECURITY: WITHIN THE PAST 12 MONTHS, THE FOOD YOU BOUGHT JUST DIDN'T LAST AND YOU DIDN'T HAVE MONEY TO GET MORE.: NEVER TRUE

## 2024-06-07 SDOH — ECONOMIC STABILITY: INCOME INSECURITY: HOW HARD IS IT FOR YOU TO PAY FOR THE VERY BASICS LIKE FOOD, HOUSING, MEDICAL CARE, AND HEATING?: NOT HARD AT ALL

## 2024-06-07 SDOH — ECONOMIC STABILITY: TRANSPORTATION INSECURITY
IN THE PAST 12 MONTHS, HAS LACK OF TRANSPORTATION KEPT YOU FROM MEETINGS, WORK, OR FROM GETTING THINGS NEEDED FOR DAILY LIVING?: NO

## 2024-06-07 ASSESSMENT — LIFESTYLE VARIABLES
SKIP TO QUESTIONS 9-10: 1
HOW OFTEN DO YOU HAVE SIX OR MORE DRINKS ON ONE OCCASION: NEVER
HOW OFTEN DO YOU HAVE A DRINK CONTAINING ALCOHOL: MONTHLY OR LESS
HOW MANY STANDARD DRINKS CONTAINING ALCOHOL DO YOU HAVE ON A TYPICAL DAY: 1 OR 2
AUDIT-C TOTAL SCORE: 1

## 2024-06-07 ASSESSMENT — SOCIAL DETERMINANTS OF HEALTH (SDOH)
HOW OFTEN DO YOU GET TOGETHER WITH FRIENDS OR RELATIVES?: TWICE A WEEK
HOW OFTEN DO YOU ATTENT MEETINGS OF THE CLUB OR ORGANIZATION YOU BELONG TO?: MORE THAN 4 TIMES PER YEAR
HOW OFTEN DO YOU ATTENT MEETINGS OF THE CLUB OR ORGANIZATION YOU BELONG TO?: MORE THAN 4 TIMES PER YEAR
HOW OFTEN DO YOU HAVE A DRINK CONTAINING ALCOHOL: MONTHLY OR LESS
WITHIN THE PAST 12 MONTHS, YOU WORRIED THAT YOUR FOOD WOULD RUN OUT BEFORE YOU GOT THE MONEY TO BUY MORE: NEVER TRUE
HOW OFTEN DO YOU GET TOGETHER WITH FRIENDS OR RELATIVES?: TWICE A WEEK
DO YOU BELONG TO ANY CLUBS OR ORGANIZATIONS SUCH AS CHURCH GROUPS UNIONS, FRATERNAL OR ATHLETIC GROUPS, OR SCHOOL GROUPS?: YES
DO YOU BELONG TO ANY CLUBS OR ORGANIZATIONS SUCH AS CHURCH GROUPS UNIONS, FRATERNAL OR ATHLETIC GROUPS, OR SCHOOL GROUPS?: YES
HOW HARD IS IT FOR YOU TO PAY FOR THE VERY BASICS LIKE FOOD, HOUSING, MEDICAL CARE, AND HEATING?: NOT HARD AT ALL
HOW OFTEN DO YOU ATTEND CHURCH OR RELIGIOUS SERVICES?: MORE THAN 4 TIMES PER YEAR
IN A TYPICAL WEEK, HOW MANY TIMES DO YOU TALK ON THE PHONE WITH FAMILY, FRIENDS, OR NEIGHBORS?: THREE TIMES A WEEK
HOW OFTEN DO YOU ATTEND CHURCH OR RELIGIOUS SERVICES?: MORE THAN 4 TIMES PER YEAR
HOW MANY DRINKS CONTAINING ALCOHOL DO YOU HAVE ON A TYPICAL DAY WHEN YOU ARE DRINKING: 1 OR 2
HOW OFTEN DO YOU HAVE SIX OR MORE DRINKS ON ONE OCCASION: NEVER
IN A TYPICAL WEEK, HOW MANY TIMES DO YOU TALK ON THE PHONE WITH FAMILY, FRIENDS, OR NEIGHBORS?: THREE TIMES A WEEK

## 2024-06-10 ENCOUNTER — OFFICE VISIT (OUTPATIENT)
Dept: MEDICAL GROUP | Facility: PHYSICIAN GROUP | Age: 74
End: 2024-06-10
Payer: MEDICARE

## 2024-06-10 VITALS
HEIGHT: 68 IN | TEMPERATURE: 97.4 F | WEIGHT: 191.3 LBS | HEART RATE: 70 BPM | DIASTOLIC BLOOD PRESSURE: 70 MMHG | SYSTOLIC BLOOD PRESSURE: 102 MMHG | RESPIRATION RATE: 16 BRPM | BODY MASS INDEX: 28.99 KG/M2 | OXYGEN SATURATION: 96 %

## 2024-06-10 DIAGNOSIS — E03.9 HYPOTHYROIDISM, UNSPECIFIED TYPE: ICD-10-CM

## 2024-06-10 DIAGNOSIS — R51.9 NONINTRACTABLE HEADACHE, UNSPECIFIED CHRONICITY PATTERN, UNSPECIFIED HEADACHE TYPE: ICD-10-CM

## 2024-06-10 DIAGNOSIS — Z79.891 CHRONIC USE OF OPIATE FOR THERAPEUTIC PURPOSE: ICD-10-CM

## 2024-06-10 DIAGNOSIS — H26.9 CATARACT OF BOTH EYES, UNSPECIFIED CATARACT TYPE: ICD-10-CM

## 2024-06-10 DIAGNOSIS — E78.5 DYSLIPIDEMIA: ICD-10-CM

## 2024-06-10 DIAGNOSIS — M25.561 CHRONIC PAIN OF BOTH KNEES: ICD-10-CM

## 2024-06-10 DIAGNOSIS — F17.210 CIGARETTE NICOTINE DEPENDENCE WITHOUT COMPLICATION: ICD-10-CM

## 2024-06-10 DIAGNOSIS — M25.562 CHRONIC PAIN OF BOTH KNEES: ICD-10-CM

## 2024-06-10 DIAGNOSIS — J45.20 MILD INTERMITTENT ASTHMA WITHOUT COMPLICATION: ICD-10-CM

## 2024-06-10 DIAGNOSIS — R73.01 IFG (IMPAIRED FASTING GLUCOSE): ICD-10-CM

## 2024-06-10 DIAGNOSIS — E89.0 POSTOPERATIVE HYPOTHYROIDISM: ICD-10-CM

## 2024-06-10 DIAGNOSIS — D75.1 POLYCYTHEMIA: ICD-10-CM

## 2024-06-10 DIAGNOSIS — G89.29 CHRONIC PAIN OF BOTH KNEES: ICD-10-CM

## 2024-06-10 DIAGNOSIS — K21.9 GASTROESOPHAGEAL REFLUX DISEASE, UNSPECIFIED WHETHER ESOPHAGITIS PRESENT: ICD-10-CM

## 2024-06-10 DIAGNOSIS — F41.1 GAD (GENERALIZED ANXIETY DISORDER): ICD-10-CM

## 2024-06-10 DIAGNOSIS — F33.0 MAJOR DEPRESSIVE DISORDER, RECURRENT EPISODE, MILD (HCC): ICD-10-CM

## 2024-06-10 DIAGNOSIS — E55.9 VITAMIN D DEFICIENCY: ICD-10-CM

## 2024-06-10 DIAGNOSIS — M54.50 CHRONIC LOW BACK PAIN, UNSPECIFIED BACK PAIN LATERALITY, UNSPECIFIED WHETHER SCIATICA PRESENT: ICD-10-CM

## 2024-06-10 DIAGNOSIS — F41.9 ANXIETY: ICD-10-CM

## 2024-06-10 DIAGNOSIS — G89.29 CHRONIC LOW BACK PAIN, UNSPECIFIED BACK PAIN LATERALITY, UNSPECIFIED WHETHER SCIATICA PRESENT: ICD-10-CM

## 2024-06-10 DIAGNOSIS — L98.9 SKIN LESION OF FACE: ICD-10-CM

## 2024-06-10 PROBLEM — R05.1 ACUTE COUGH: Status: RESOLVED | Noted: 2023-07-26 | Resolved: 2024-06-10

## 2024-06-10 PROBLEM — M79.604 LEG PAIN, BILATERAL: Status: RESOLVED | Noted: 2023-10-30 | Resolved: 2024-06-10

## 2024-06-10 PROBLEM — M79.605 LEG PAIN, BILATERAL: Status: RESOLVED | Noted: 2023-10-30 | Resolved: 2024-06-10

## 2024-06-10 PROBLEM — H16.009 CORNEAL ULCER: Status: RESOLVED | Noted: 2021-10-12 | Resolved: 2024-06-10

## 2024-06-10 PROCEDURE — 3074F SYST BP LT 130 MM HG: CPT | Performed by: PHYSICIAN ASSISTANT

## 2024-06-10 PROCEDURE — 3078F DIAST BP <80 MM HG: CPT | Performed by: PHYSICIAN ASSISTANT

## 2024-06-10 PROCEDURE — 99214 OFFICE O/P EST MOD 30 MIN: CPT | Performed by: PHYSICIAN ASSISTANT

## 2024-06-10 RX ORDER — FLUTICASONE FUROATE AND VILANTEROL 100; 25 UG/1; UG/1
1 POWDER RESPIRATORY (INHALATION) DAILY
Qty: 1 EACH | Refills: 11 | Status: SHIPPED | OUTPATIENT
Start: 2024-06-10

## 2024-06-10 RX ORDER — ESCITALOPRAM OXALATE 20 MG/1
20 TABLET ORAL DAILY
Qty: 90 TABLET | Refills: 3 | Status: SHIPPED | OUTPATIENT
Start: 2024-06-10

## 2024-06-10 RX ORDER — HYDROCODONE BITARTRATE AND ACETAMINOPHEN 5; 325 MG/1; MG/1
1 TABLET ORAL
Qty: 90 TABLET | Refills: 0 | Status: SHIPPED | OUTPATIENT
Start: 2024-06-10 | End: 2024-09-08

## 2024-06-10 RX ORDER — LEVOTHYROXINE SODIUM 0.1 MG/1
100 TABLET ORAL
Qty: 90 TABLET | Refills: 3 | Status: SHIPPED | OUTPATIENT
Start: 2024-06-10

## 2024-06-10 RX ORDER — LIOTHYRONINE SODIUM 5 UG/1
5 TABLET ORAL DAILY
Qty: 90 TABLET | Refills: 3 | Status: SHIPPED | OUTPATIENT
Start: 2024-06-10

## 2024-06-10 ASSESSMENT — ENCOUNTER SYMPTOMS
SHORTNESS OF BREATH: 0
FEVER: 0
CHILLS: 0

## 2024-06-10 ASSESSMENT — FIBROSIS 4 INDEX: FIB4 SCORE: 0.96

## 2024-06-10 NOTE — ASSESSMENT & PLAN NOTE
Chronic, controlled.  Changing to Breo from Symbicort due to insurance.  Feels it's working better than Symbicort.

## 2024-06-10 NOTE — ASSESSMENT & PLAN NOTE
Chronic, uncontrolled.  Component      Latest Ref Rng 4/13/2024   Hemoglobin      12.0 - 16.0 g/dL 16.4 (H)    Hematocrit      37.0 - 47.0 % 50.9 (H)    Patient smokes 1/2 ppd.  Checking additional labs.

## 2024-06-10 NOTE — ASSESSMENT & PLAN NOTE
Chronic, uncontrolled.  Tolerating/continue hydrocodone/acetaminophen 5/325 once daily as needed.    Declines referral to orthopedics at this time.    UDS consistent 10/2023   Consent form signed 10/2023.

## 2024-06-10 NOTE — ASSESSMENT & PLAN NOTE
Chronic, uncontrolled.  Still smokes 1/2-1 ppd.  Wants to quit.  Looking into a program.  Referring to lung cancer screening program.

## 2024-06-10 NOTE — PROGRESS NOTES
SUBJECTIVE:     CC: establish care    HPI:   Sola presents today with the following:    ASSESSMENT & PLAN by Problem:       Problem List Items Addressed This Visit       Asthma     Chronic, controlled.  Changing to Breo from Symbicort due to insurance.  Feels it's working better than Symbicort.         Chronic lower back pain     Chronic, uncontrolled.  Tolerating/continue hydrocodone/acetaminophen 5/325 once daily as needed.     Declines referral to orthopedics at this time.     UDS consistent 10/2023   Consent form signed 10/2023.            Chronic pain of both knees     Chronic, uncontrolled.  Tolerating/continue hydrocodone/acetaminophen 5/325 once daily as needed.    Declines referral to orthopedics at this time.    UDS consistent 10/2023   Consent form signed 10/2023.            Chronic use of opiate for therapeutic purpose     UDS consistent 10/2023   Consent form signed 10/2023.         Dyslipidemia     Chronic, controlled.  Tolerating/continue rosuvastatin 5 mg daily.         RESOLVED: DIMITRI (generalized anxiety disorder)     Chronic, controlled.  Tolerating/continue lexapro 20mg daily.         GERD (gastroesophageal reflux disease)     Chronic, controlled.  Tolerating/continue omeprazole 20mg daily.         Headache     Chronic, intermittent.  Zyrtec seems to help.         RESOLVED: Hypothyroidism     Chronic, controlled.  Tolerating/continue levothyroxine 100mcg and liothyronine 5mcg daily.         IFG (impaired fasting glucose)     Chronic, uncontrolled.  Mildly elevated.  107 (4/2024)  Checking A1C.         Major depressive disorder, recurrent episode, mild (HCC)     Chronic, controlled.  Tolerating/continue lexapro 20mg daily.         Nicotine dependence     Chronic, uncontrolled.  Still smokes 1/2-1 ppd.  Wants to quit.  Looking into a program.  Referring to lung cancer screening program.         Polycythemia     Chronic, uncontrolled.  Component      Latest Ref Rng 4/13/2024   Hemoglobin      12.0 -  16.0 g/dL 16.4 (H)    Hematocrit      37.0 - 47.0 % 50.9 (H)    Patient smokes 1/2 ppd.  Checking additional labs.         Postoperative hypothyroidism     Chronic, controlled.  Tolerating/continue levothyroxine 100mcg and liothyronine 5mcg daily.          Other Visit Diagnoses       Skin lesion of face        Anxiety                  UDS 10/4/2023: no medications detected    Mammogram ordered 9/2023:      Labs to be drawn in October:      PDMP review shows no signs of medication abuse or misuse:      Return in about 4 months (around 10/10/2024) for medication refill.        Healthcare Maintenance:      HPI:     Problem   Polycythemia    Chronic, uncontrolled.  Component      Latest Ref Rng 4/13/2024   Hemoglobin      12.0 - 16.0 g/dL 16.4 (H)    Hematocrit      37.0 - 47.0 % 50.9 (H)    Patient smokes 1/2 ppd.  Checking additional labs.     Ifg (Impaired Fasting Glucose)    Chronic, uncontrolled.  Mildly elevated.  107 (4/2024)  Checking A1C.     Chronic Use of Opiate for Therapeutic Purpose    UDS consistent 10/2023   Consent form signed 10/2023.     Headache    Chronic, intermittent.  Zyrtec seems to help.    Interval history:   Intermittent since Thanksgiving 2023.  Daily, some days worse than other.  Sneezing helps.  Pain in the temple area.  Tried and didn't help: sudafed, claritin, sinus rinses.  No change with doxycycline, augmentin, medrol dose pack.  History of sinus surgery 30 years ago.     Nicotine Dependence    Chronic, uncontrolled.  Still smokes 1/2-1 ppd.  Wants to quit.  Looking into a program.  Referring to lung cancer screening program.     Dyslipidemia    Chronic, controlled.    Latest Labs:   Lab Results   Component Value Date/Time    CHOLSTRLTOT 145 04/13/2024 09:15 AM    LDL 76 04/13/2024 09:15 AM    HDL 49 04/13/2024 09:15 AM    TRIGLYCERIDE 102 04/13/2024 09:15 AM      Medications:Tolerating/continue rosuvastatin 5 mg daily    Risk calculator: The 10-year ASCVD risk score (Anthony GALLARDO, et  "al., 2019) is: 10.2%        Chronic Lower Back Pain    Chronic, uncontrolled.  Tolerating/continue hydrocodone/acetaminophen 5/325 once daily as needed.     Declines referral to orthopedics at this time.     UDS consistent 10/2023   Consent form signed 10/2023.        Chronic Pain of Both Knees    Chronic, uncontrolled.  Tolerating/continue hydrocodone/acetaminophen 5/325 once daily as needed.    Declines referral to orthopedics at this time.    UDS consistent 10/2023   Consent form signed 10/2023.        Postoperative Hypothyroidism    Chronic, controlled.  Tolerating/continue levothyroxine 100mcg and liothyronine 5mcg daily.     Gerd (Gastroesophageal Reflux Disease)    Chronic, controlled.  Tolerating/continue omeprazole 20mg daily.     Major Depressive Disorder, Recurrent Episode, Mild (Hcc)    Chronic, controlled.  Tolerating/continue lexapro 20mg daily.     Asthma    Chronic, controlled.  Changing to Breo from Symbicort due to insurance.  Feels it's working better than Symbicort.  Tolerating albuterol when needed but doesn't need it very often.     Hypothyroidism (Resolved)    Chronic, controlled.  Tolerating/continue levothyroxine 100mcg and liothyronine 5mcg daily.       Leg Pain, Bilateral (Resolved)   Acute Cough (Resolved)   Esvin (Generalized Anxiety Disorder) (Resolved)    Chronic, controlled.  Tolerating/continue lexapro 20mg daily.     Corneal Ulcer (Resolved)              ROS:  Review of Systems   Constitutional:  Negative for chills and fever.   Respiratory:  Negative for shortness of breath.    Cardiovascular:  Negative for chest pain.       OBJECTIVE:     Exam:  /70 (BP Location: Right arm, Patient Position: Sitting, BP Cuff Size: Adult)   Pulse 70   Temp 36.3 °C (97.4 °F) (Temporal)   Resp 16   Ht 1.727 m (5' 8\")   Wt 86.8 kg (191 lb 4.8 oz)   SpO2 96%   BMI 29.09 kg/m²  Body mass index is 29.09 kg/m².    Physical Exam  Vitals reviewed.   Constitutional:       General: She is not in " acute distress.     Appearance: Normal appearance.   Pulmonary:      Effort: Pulmonary effort is normal.   Neurological:      General: No focal deficit present.      Mental Status: She is alert.   Psychiatric:         Mood and Affect: Mood normal.         Behavior: Behavior normal.         Judgment: Judgment normal.       Consequences of Chronic Opiate therapy:  (5 A's)  Analgesia:  improved  Activity:  improved  Adverse Events:  none  Aberrant Behaviors:  none  Affect/Mood: good grooming, full facial expressions, normal speech pattern and content, normal thought patterns, normal perception, good insight, normal reasoning  Last CMP:   4/13/2024, normal renal and liver function  Appropriate Imaging done:   declines further workup      CHART REVIEW:     Labs:                         Please note that this dictation was created using voice recognition software. I have made every reasonable attempt to correct obvious errors, but I expect that there are errors of grammar and possibly content that I did not discover before finalizing the note.

## 2024-06-20 ENCOUNTER — TELEPHONE (OUTPATIENT)
Dept: HEALTH INFORMATION MANAGEMENT | Facility: OTHER | Age: 74
End: 2024-06-20

## 2024-06-20 ENCOUNTER — APPOINTMENT (RX ONLY)
Dept: URBAN - METROPOLITAN AREA CLINIC 36 | Facility: CLINIC | Age: 74
Setting detail: DERMATOLOGY
End: 2024-06-20

## 2024-06-20 DIAGNOSIS — Z41.9 ENCOUNTER FOR PROCEDURE FOR PURPOSES OTHER THAN REMEDYING HEALTH STATE, UNSPECIFIED: ICD-10-CM

## 2024-06-20 PROCEDURE — ? FILLERS

## 2024-06-20 PROCEDURE — ? BOTOX

## 2024-06-20 NOTE — PROCEDURE: BOTOX
Lcl Root Units: 0
Detail Level: Detailed
Post-Care Instructions: Patient instructed to not lie down for 4 hours and limit physical activity for 24 hours. Patient instructed not to travel by airplane for 48 hours.
Show Additional Area 6: Yes
Show Right And Left Pupillary Line Units: No
Expiration Date (Month Year): 09/2026
Additional Area 1 Units: 4
Lot #: [id number]
Additional Area 3 Location: masseter
Periorbital Skin Units: 46
Additional Area 1 Location: upper lip
Consent: Written consent obtained. Risks include but not limited to lid/brow ptosis, bruising, swelling, diplopia, temporary effect, incomplete chemical denervation.
Incrementing Botox Units: By 0.5 Units
Dilution (U/0.1 Cc): 0.1
Additional Area 2 Location: chin

## 2024-06-20 NOTE — PROCEDURE: FILLERS
Jawline Filler Volume In Cc: 0
Voluma Silk Syringe Price (Per 1.0 Cc- Numbers Only No Special Characters Or $): 0.00
Expiration Date (Month Year): 08/02/2024
Include Cannula Information In Note?: Yes
Nasolabial Folds Filler Volume In Cc: 0.8
Detail Level: Detailed
Include Cannula Brand?: DermaSculpt
Lot #: [id number]
Include Cannula Size?: 27G
Filler: Skinvive
Use Map Statement For Sites (Optional): No
Additional Area 1 Location: tarah oral rhytids
Expiration Date (Month Year): 08/27/2024
Cheeks Filler Volume In Cc: 1
Include Cannula Length?: 1.5 inch
Map Statment: See Attach Map for Complete Details
Additional Area 2 Location: chin
Consent: Written consent obtained. Risks include but not limited to bruising, beading, irregular texture, ulceration, infection, allergic reaction, scar formation, incomplete augmentation, temporary nature, procedural pain.
Lot #: [id number]
Filler: Juvederm Volbella XC
Pre-Treatment: Skin was cleaned with alcohol prior to injections.
Post-Care Instructions: Patient instructed to apply ice to reduce swelling.
Lot #: [id number]
Expiration Date (Month Year): 07/17/2024
Topical Anesthesia?: 23% lidocaine, 7% tetracaine
Additional Area 1 Location: tarah oral rhythm
Expiration Date (Month Year): 09/10/2025
Pricing Information: This plan will add up the cumulative amount of filler you document and will bill based on the unit price noted below. For example if you inject 0.9 ccs of Restylane it will bill for one unit. If you inject 1.3 ccs it will bill for two units.
Additional Area 2 Volume In Cc: 0.2
Filler: Juvederm Ultra XC
Lot #: [id number]

## 2024-06-20 NOTE — TELEPHONE ENCOUNTER
Outcome: Left Message    Please transfer to Patient Outreach Team at [phone] when patient returns call.      Attempt # 1

## 2024-06-28 NOTE — TELEPHONE ENCOUNTER
Outcome: Left message.    LVM with Direct line for call back in regards to Lung CA Screen Eligibility Q&A/  Warm Transfer to Patient Outreach [phone]

## 2024-07-05 ENCOUNTER — APPOINTMENT (OUTPATIENT)
Dept: URGENT CARE | Facility: PHYSICIAN GROUP | Age: 74
End: 2024-07-05
Payer: MEDICARE

## 2024-08-13 ENCOUNTER — OFFICE VISIT (OUTPATIENT)
Dept: MEDICAL GROUP | Facility: PHYSICIAN GROUP | Age: 74
End: 2024-08-13
Payer: MEDICARE

## 2024-08-13 VITALS
OXYGEN SATURATION: 96 % | SYSTOLIC BLOOD PRESSURE: 90 MMHG | HEIGHT: 68 IN | BODY MASS INDEX: 28.89 KG/M2 | DIASTOLIC BLOOD PRESSURE: 66 MMHG | RESPIRATION RATE: 16 BRPM | WEIGHT: 190.6 LBS | TEMPERATURE: 98.6 F | HEART RATE: 67 BPM

## 2024-08-13 DIAGNOSIS — R61 EXCESSIVE SWEATING: ICD-10-CM

## 2024-08-13 PROCEDURE — 99213 OFFICE O/P EST LOW 20 MIN: CPT | Performed by: PHYSICIAN ASSISTANT

## 2024-08-13 PROCEDURE — 3078F DIAST BP <80 MM HG: CPT | Performed by: PHYSICIAN ASSISTANT

## 2024-08-13 PROCEDURE — 3074F SYST BP LT 130 MM HG: CPT | Performed by: PHYSICIAN ASSISTANT

## 2024-08-13 ASSESSMENT — ENCOUNTER SYMPTOMS
SHORTNESS OF BREATH: 0
CHILLS: 0
FEVER: 0

## 2024-08-13 ASSESSMENT — FIBROSIS 4 INDEX: FIB4 SCORE: 0.98

## 2024-08-13 NOTE — PROGRESS NOTES
"SUBJECTIVE:     CC: excessive scalp sweating    HPI:   Sola presents today with the following:      ASSESSMENT & PLAN by Problem:       Problem List Items Addressed This Visit       Excessive sweating     Chronic, uncontrolled.  Referring to dermatology.            Instructions for follow-up primary care visit 6/10/2024:  Return in about 4 months (around 10/10/2024) for medication refill.       Return if symptoms worsen or fail to improve.         HPI:     Sweating since summer 2023  Not year round, just when it's hot outside  20 minute episodes of sweating on her entire face  Scalp feels hot to touch and is sweating  Multiple episodes this summer    No history of the same    No fever, night sweats, anxiety, CP, palpitations, GI issues           ROS:  Review of Systems   Constitutional:  Negative for chills and fever.   Respiratory:  Negative for shortness of breath.    Cardiovascular:  Negative for chest pain.       OBJECTIVE:     Exam:  BP 90/66 (BP Location: Right arm, Patient Position: Sitting, BP Cuff Size: Adult)   Pulse 67   Temp 37 °C (98.6 °F) (Temporal)   Resp 16   Ht 1.727 m (5' 8\")   Wt 86.5 kg (190 lb 9.6 oz)   SpO2 96%   BMI 28.98 kg/m²  Body mass index is 28.98 kg/m².    Physical Exam  Vitals reviewed.   Constitutional:       General: She is not in acute distress.     Appearance: Normal appearance.   Pulmonary:      Effort: Pulmonary effort is normal.   Neurological:      General: No focal deficit present.      Mental Status: She is alert.   Psychiatric:         Mood and Affect: Mood normal.         Behavior: Behavior normal.         Judgment: Judgment normal.                Please note that this dictation was created using voice recognition software. I have made every reasonable attempt to correct obvious errors, but I expect that there are errors of grammar and possibly content that I did not discover before finalizing the note.    "

## 2024-08-13 NOTE — PATIENT INSTRUCTIONS
[facility]  [facility]  [address], [address]  [place] [id number]  Phone: [phone]

## 2024-08-29 ENCOUNTER — OFFICE VISIT (OUTPATIENT)
Dept: DERMATOLOGY | Facility: IMAGING CENTER | Age: 74
End: 2024-08-29
Payer: MEDICARE

## 2024-08-29 DIAGNOSIS — R61 HYPERHIDROSIS: ICD-10-CM

## 2024-08-29 DIAGNOSIS — L72.0 MILIA: ICD-10-CM

## 2024-08-29 DIAGNOSIS — L72.0 EPIDERMOID CYST: ICD-10-CM

## 2024-08-29 RX ORDER — ADAPALENE GEL USP, 0.3% 3 MG/G
GEL TOPICAL
Qty: 45 G | Refills: 6 | Status: SHIPPED | OUTPATIENT
Start: 2024-08-29

## 2024-08-29 NOTE — PROGRESS NOTES
[facility] Note    HPI:  Sola Garcia is a 74 y.o. female who presents today for evaluation of:   Chief Complaint   Patient presents with    Establish Care    Skin Lesion     Bump under lt eye.        #1 - White bumps on chins: has had extractions with , advised she see derm for deeper lesions. Waxes and wanes.     #2 - white bump on left eyelid margin, persistent over time, stable, asymptomatic     #3 - excessive sweating on frontal scalp, happens during summer, limits her social activity due to severity      ROS: no F/C. Pertinent positives and negatives above.     Meds/PMH/PSH/FamHx/Allergies: Reviewed in the chart, relevant history noted above.         PHYSICAL EXAM / ASSESSMENT / PLAN: A total body skin exam was completed of the scalp, hair, ears, face, eyelids, conjunctiva, lips, neck, chest, abdomen, back, left and right upper extremities (including hands/digits and fingernails), left and right lower extremities (including feet/toes, toenails), buttocks. Notable findings per problem as listed below.     Milia, chin, solar changes:   - none present today on exam, has some elastosis and skin textural changes   - discussed this is related to sun exposure over time   - start adapalene 0.1% gel at night. This can be purchased over the counter. To use pea-sized amount on entire face; start 2-3 nights/week and titrate up as tolerated. S/e irritation discussed  - call if recurrent bumps for re-eval     Cyst vs milia on left eyelid inferior margin   -appears benign, advise evaluation per ophtho given location if removal needed  - she has follow up with them in one month     Hyperhidrosis  - start 20% aluminum chloride hexahydrate at night    - instructed to apply at night and rinse off in morning   - consider topical glycopyrronium vs botox in future            Follow up: gabrielle Diaz MD   [facility]

## 2024-10-15 ENCOUNTER — HOSPITAL ENCOUNTER (OUTPATIENT)
Dept: LAB | Facility: MEDICAL CENTER | Age: 74
End: 2024-10-15
Attending: PHYSICIAN ASSISTANT
Payer: MEDICARE

## 2024-10-15 DIAGNOSIS — R73.01 IFG (IMPAIRED FASTING GLUCOSE): ICD-10-CM

## 2024-10-15 DIAGNOSIS — R71.8 OTHER ABNORMALITY OF RED BLOOD CELLS: ICD-10-CM

## 2024-10-15 DIAGNOSIS — D75.1 POLYCYTHEMIA: ICD-10-CM

## 2024-10-15 DIAGNOSIS — E55.9 VITAMIN D DEFICIENCY: ICD-10-CM

## 2024-10-15 DIAGNOSIS — R53.83 FATIGUE, UNSPECIFIED TYPE: ICD-10-CM

## 2024-10-15 DIAGNOSIS — R79.89 OTHER SPECIFIED ABNORMAL FINDINGS OF BLOOD CHEMISTRY: ICD-10-CM

## 2024-10-15 LAB
25(OH)D3 SERPL-MCNC: 49 NG/ML (ref 30–100)
BASOPHILS # BLD AUTO: 0.8 % (ref 0–1.8)
BASOPHILS # BLD: 0.07 K/UL (ref 0–0.12)
EOSINOPHIL # BLD AUTO: 0.43 K/UL (ref 0–0.51)
EOSINOPHIL NFR BLD: 4.9 % (ref 0–6.9)
ERYTHROCYTE [DISTWIDTH] IN BLOOD BY AUTOMATED COUNT: 44.4 FL (ref 35.9–50)
EST. AVERAGE GLUCOSE BLD GHB EST-MCNC: 134 MG/DL
FERRITIN SERPL-MCNC: 26 NG/ML (ref 10–291)
HBA1C MFR BLD: 6.3 % (ref 4–5.6)
HCT VFR BLD AUTO: 45.7 % (ref 37–47)
HGB BLD-MCNC: 15.4 G/DL (ref 12–16)
IMM GRANULOCYTES # BLD AUTO: 0.03 K/UL (ref 0–0.11)
IMM GRANULOCYTES NFR BLD AUTO: 0.3 % (ref 0–0.9)
IRON SATN MFR SERPL: 23 % (ref 15–55)
IRON SERPL-MCNC: 79 UG/DL (ref 40–170)
LYMPHOCYTES # BLD AUTO: 2.79 K/UL (ref 1–4.8)
LYMPHOCYTES NFR BLD: 31.8 % (ref 22–41)
MCH RBC QN AUTO: 32 PG (ref 27–33)
MCHC RBC AUTO-ENTMCNC: 33.7 G/DL (ref 32.2–35.5)
MCV RBC AUTO: 95 FL (ref 81.4–97.8)
MONOCYTES # BLD AUTO: 0.56 K/UL (ref 0–0.85)
MONOCYTES NFR BLD AUTO: 6.4 % (ref 0–13.4)
NEUTROPHILS # BLD AUTO: 4.9 K/UL (ref 1.82–7.42)
NEUTROPHILS NFR BLD: 55.8 % (ref 44–72)
NRBC # BLD AUTO: 0 K/UL
NRBC BLD-RTO: 0 /100 WBC (ref 0–0.2)
PLATELET # BLD AUTO: 308 K/UL (ref 164–446)
PMV BLD AUTO: 10.7 FL (ref 9–12.9)
RBC # BLD AUTO: 4.81 M/UL (ref 4.2–5.4)
TIBC SERPL-MCNC: 347 UG/DL (ref 250–450)
UIBC SERPL-MCNC: 268 UG/DL (ref 110–370)
VIT B12 SERPL-MCNC: 3239 PG/ML (ref 211–911)
WBC # BLD AUTO: 8.8 K/UL (ref 4.8–10.8)

## 2024-10-15 PROCEDURE — 81219 CALR GENE COM VARIANTS: CPT

## 2024-10-15 PROCEDURE — 81270 JAK2 GENE: CPT

## 2024-10-15 PROCEDURE — 82607 VITAMIN B-12: CPT

## 2024-10-15 PROCEDURE — 82728 ASSAY OF FERRITIN: CPT

## 2024-10-15 PROCEDURE — 83550 IRON BINDING TEST: CPT

## 2024-10-15 PROCEDURE — 81338 MPL GENE COMMON VARIANTS: CPT

## 2024-10-15 PROCEDURE — 36415 COLL VENOUS BLD VENIPUNCTURE: CPT

## 2024-10-15 PROCEDURE — 83540 ASSAY OF IRON: CPT

## 2024-10-15 PROCEDURE — 82668 ASSAY OF ERYTHROPOIETIN: CPT

## 2024-10-15 PROCEDURE — 85025 COMPLETE CBC W/AUTO DIFF WBC: CPT

## 2024-10-15 PROCEDURE — 82306 VITAMIN D 25 HYDROXY: CPT

## 2024-10-15 PROCEDURE — 83036 HEMOGLOBIN GLYCOSYLATED A1C: CPT

## 2024-10-16 LAB — EPO SERPL-ACNC: 12 MU/ML (ref 4–27)

## 2024-10-21 ENCOUNTER — HOSPITAL ENCOUNTER (OUTPATIENT)
Facility: MEDICAL CENTER | Age: 74
End: 2024-10-21
Attending: PHYSICIAN ASSISTANT
Payer: MEDICARE

## 2024-10-21 ENCOUNTER — APPOINTMENT (OUTPATIENT)
Dept: MEDICAL GROUP | Facility: PHYSICIAN GROUP | Age: 74
End: 2024-10-21
Payer: MEDICARE

## 2024-10-21 VITALS
RESPIRATION RATE: 16 BRPM | HEART RATE: 67 BPM | TEMPERATURE: 98.5 F | OXYGEN SATURATION: 97 % | DIASTOLIC BLOOD PRESSURE: 60 MMHG | SYSTOLIC BLOOD PRESSURE: 114 MMHG | HEIGHT: 68 IN | BODY MASS INDEX: 28.39 KG/M2 | WEIGHT: 187.3 LBS

## 2024-10-21 DIAGNOSIS — Z71.89 ADVANCE CARE PLANNING: ICD-10-CM

## 2024-10-21 DIAGNOSIS — Z12.31 ENCOUNTER FOR SCREENING MAMMOGRAM FOR MALIGNANT NEOPLASM OF BREAST: ICD-10-CM

## 2024-10-21 DIAGNOSIS — Z79.891 CHRONIC USE OF OPIATE FOR THERAPEUTIC PURPOSE: ICD-10-CM

## 2024-10-21 DIAGNOSIS — R51.9 NONINTRACTABLE HEADACHE, UNSPECIFIED CHRONICITY PATTERN, UNSPECIFIED HEADACHE TYPE: ICD-10-CM

## 2024-10-21 DIAGNOSIS — M54.50 CHRONIC LOW BACK PAIN, UNSPECIFIED BACK PAIN LATERALITY, UNSPECIFIED WHETHER SCIATICA PRESENT: ICD-10-CM

## 2024-10-21 DIAGNOSIS — J45.20 MILD INTERMITTENT ASTHMA WITHOUT COMPLICATION: ICD-10-CM

## 2024-10-21 DIAGNOSIS — G89.29 CHRONIC LOW BACK PAIN, UNSPECIFIED BACK PAIN LATERALITY, UNSPECIFIED WHETHER SCIATICA PRESENT: ICD-10-CM

## 2024-10-21 DIAGNOSIS — M25.562 CHRONIC PAIN OF BOTH KNEES: ICD-10-CM

## 2024-10-21 DIAGNOSIS — R73.03 PREDIABETES: ICD-10-CM

## 2024-10-21 DIAGNOSIS — R53.83 FATIGUE, UNSPECIFIED TYPE: ICD-10-CM

## 2024-10-21 DIAGNOSIS — M25.561 CHRONIC PAIN OF BOTH KNEES: ICD-10-CM

## 2024-10-21 DIAGNOSIS — K21.9 GASTROESOPHAGEAL REFLUX DISEASE, UNSPECIFIED WHETHER ESOPHAGITIS PRESENT: ICD-10-CM

## 2024-10-21 DIAGNOSIS — E78.5 DYSLIPIDEMIA: ICD-10-CM

## 2024-10-21 DIAGNOSIS — F17.210 CIGARETTE NICOTINE DEPENDENCE WITHOUT COMPLICATION: ICD-10-CM

## 2024-10-21 DIAGNOSIS — D75.1 POLYCYTHEMIA: ICD-10-CM

## 2024-10-21 DIAGNOSIS — E89.0 POSTOPERATIVE HYPOTHYROIDISM: ICD-10-CM

## 2024-10-21 DIAGNOSIS — G89.29 CHRONIC PAIN OF BOTH KNEES: ICD-10-CM

## 2024-10-21 PROCEDURE — 3078F DIAST BP <80 MM HG: CPT | Performed by: PHYSICIAN ASSISTANT

## 2024-10-21 PROCEDURE — 99215 OFFICE O/P EST HI 40 MIN: CPT | Performed by: PHYSICIAN ASSISTANT

## 2024-10-21 PROCEDURE — G0481 DRUG TEST DEF 8-14 CLASSES: HCPCS

## 2024-10-21 PROCEDURE — 3074F SYST BP LT 130 MM HG: CPT | Performed by: PHYSICIAN ASSISTANT

## 2024-10-21 RX ORDER — ROSUVASTATIN CALCIUM 5 MG/1
5 TABLET, COATED ORAL EVERY EVENING
Qty: 90 TABLET | Refills: 3 | Status: SHIPPED | OUTPATIENT
Start: 2024-10-21

## 2024-10-21 RX ORDER — HYDROCODONE BITARTRATE AND ACETAMINOPHEN 5; 325 MG/1; MG/1
1 TABLET ORAL
Qty: 90 TABLET | Refills: 0 | Status: SHIPPED | OUTPATIENT
Start: 2024-10-21 | End: 2025-01-19

## 2024-10-21 RX ORDER — CIPROFLOXACIN HYDROCHLORIDE 3.5 MG/ML
1 SOLUTION/ DROPS TOPICAL
Qty: 10 ML | Refills: 0 | Status: SHIPPED | OUTPATIENT
Start: 2024-10-21

## 2024-10-21 ASSESSMENT — ENCOUNTER SYMPTOMS
FEVER: 0
SHORTNESS OF BREATH: 0
CHILLS: 0

## 2024-10-21 ASSESSMENT — FIBROSIS 4 INDEX: FIB4 SCORE: 0.88

## 2024-10-23 LAB
JAK2 P.V617F BLD/T QL: NOT DETECTED
SPECIMEN SOURCE: NORMAL

## 2024-10-24 LAB
GENE XXX MUT ANL BLD/T: NOT DETECTED
SPECIMEN SOURCE: NORMAL

## 2024-10-25 LAB
1OH-MIDAZOLAM UR QL SCN: NOT DETECTED
6MAM UR QL: NOT DETECTED
7AMINOCLONAZEPAM UR QL: NOT DETECTED
A-OH ALPRAZ UR QL: NOT DETECTED
ALPRAZ UR QL: NOT DETECTED
AMPHET UR QL SCN: NOT DETECTED
ANNOTATION COMMENT IMP: NORMAL
BARBITURATES UR QL: NEGATIVE
BUPRENORPHINE UR QL: NOT DETECTED
BZE UR QL: NEGATIVE
CARBOXYTHC UR QL: NEGATIVE
CARISOPRODOL UR QL: NEGATIVE
CLONAZEPAM UR QL: NOT DETECTED
CODEINE UR QL: NOT DETECTED
CREAT UR-MCNC: 58.7 MG/DL (ref 20–400)
DIAZEPAM UR QL: NOT DETECTED
ETHYL GLUCURONIDE UR QL: NEGATIVE
FENTANYL UR QL: NOT DETECTED
GABAPENTIN UR QL CFM: NOT DETECTED
HYDROCODONE UR QL: PRESENT
HYDROMORPHONE UR QL: NOT DETECTED
LORAZEPAM UR QL: NOT DETECTED
MDA UR QL: NOT DETECTED
MDEA UR QL: NOT DETECTED
MDMA UR QL: NOT DETECTED
ME-PHENIDATE UR QL: NOT DETECTED
METHADONE UR QL: NEGATIVE
METHAMPHET UR QL: NOT DETECTED
MIDAZOLAM UR QL SCN: NOT DETECTED
MORPHINE UR QL: NOT DETECTED
MPL P.W515 BLD/T QL: NOT DETECTED
NALOXONE UR QL CFM: NOT DETECTED
NORBUPRENORPHINE UR QL CFM: NOT DETECTED
NORDIAZEPAM UR QL: NOT DETECTED
NORFENTANYL UR QL: NOT DETECTED
NORHYDROCODONE UR QL CFM: NOT DETECTED
NORMEPERIDINE UR QL CFM: NOT DETECTED
NOROXYCODONE UR QL CFM: NOT DETECTED
NOROXYMORPHONE UR QL SCN: NOT DETECTED
OXAZEPAM UR QL: NOT DETECTED
OXYCODONE UR QL: NOT DETECTED
OXYMORPHONE UR QL: NOT DETECTED
PATHOLOGY STUDY: NORMAL
PCP UR QL: NEGATIVE
PHENTERMINE UR QL: NOT DETECTED
PREGABALIN UR QL CFM: NOT DETECTED
SERVICE CMNT-IMP: NORMAL
SPECIMEN SOURCE: NORMAL
TAPENTADOL UR QL SCN: NOT DETECTED
TAPENTADOL UR QL SCN: NOT DETECTED
TEMAZEPAM UR QL: NOT DETECTED
TRAMADOL UR QL: NEGATIVE
ZOLPIDEM PHENYL-4-CARB UR QL SCN: NOT DETECTED
ZOLPIDEM UR QL: NOT DETECTED

## 2025-01-14 ENCOUNTER — APPOINTMENT (OUTPATIENT)
Dept: RADIOLOGY | Facility: MEDICAL CENTER | Age: 75
End: 2025-01-14
Attending: PHYSICIAN ASSISTANT
Payer: MEDICARE

## 2025-01-23 DIAGNOSIS — M54.50 CHRONIC LOW BACK PAIN, UNSPECIFIED BACK PAIN LATERALITY, UNSPECIFIED WHETHER SCIATICA PRESENT: ICD-10-CM

## 2025-01-23 DIAGNOSIS — G89.29 CHRONIC LOW BACK PAIN, UNSPECIFIED BACK PAIN LATERALITY, UNSPECIFIED WHETHER SCIATICA PRESENT: ICD-10-CM

## 2025-01-23 RX ORDER — HYDROCODONE BITARTRATE AND ACETAMINOPHEN 5; 325 MG/1; MG/1
1 TABLET ORAL
Qty: 90 TABLET | Refills: 0 | Status: CANCELLED | OUTPATIENT
Start: 2025-01-23 | End: 2025-04-23

## 2025-01-23 RX ORDER — HYDROCODONE BITARTRATE AND ACETAMINOPHEN 5; 325 MG/1; MG/1
1 TABLET ORAL
Qty: 90 TABLET | Refills: 0 | Status: SHIPPED | OUTPATIENT
Start: 2025-01-23 | End: 2025-04-23

## 2025-04-03 ENCOUNTER — HOSPITAL ENCOUNTER (OUTPATIENT)
Dept: LAB | Facility: MEDICAL CENTER | Age: 75
End: 2025-04-03
Attending: PHYSICIAN ASSISTANT
Payer: MEDICARE

## 2025-04-03 DIAGNOSIS — E78.5 DYSLIPIDEMIA: ICD-10-CM

## 2025-04-03 DIAGNOSIS — R73.03 PREDIABETES: ICD-10-CM

## 2025-04-03 DIAGNOSIS — E89.0 POSTOPERATIVE HYPOTHYROIDISM: ICD-10-CM

## 2025-04-03 DIAGNOSIS — R53.83 FATIGUE, UNSPECIFIED TYPE: ICD-10-CM

## 2025-04-03 LAB
ALBUMIN SERPL BCP-MCNC: 3.9 G/DL (ref 3.2–4.9)
ALBUMIN/GLOB SERPL: 1.4 G/DL
ALP SERPL-CCNC: 70 U/L (ref 30–99)
ALT SERPL-CCNC: 15 U/L (ref 2–50)
ANION GAP SERPL CALC-SCNC: 12 MMOL/L (ref 7–16)
AST SERPL-CCNC: 21 U/L (ref 12–45)
BILIRUB SERPL-MCNC: 0.8 MG/DL (ref 0.1–1.5)
BUN SERPL-MCNC: 12 MG/DL (ref 8–22)
CALCIUM ALBUM COR SERPL-MCNC: 9.3 MG/DL (ref 8.5–10.5)
CALCIUM SERPL-MCNC: 9.2 MG/DL (ref 8.5–10.5)
CHLORIDE SERPL-SCNC: 106 MMOL/L (ref 96–112)
CHOLEST SERPL-MCNC: 125 MG/DL (ref 100–199)
CO2 SERPL-SCNC: 21 MMOL/L (ref 20–33)
CREAT SERPL-MCNC: 0.68 MG/DL (ref 0.5–1.4)
CREAT UR-MCNC: 131 MG/DL
EST. AVERAGE GLUCOSE BLD GHB EST-MCNC: 126 MG/DL
GFR SERPLBLD CREATININE-BSD FMLA CKD-EPI: 92 ML/MIN/1.73 M 2
GLOBULIN SER CALC-MCNC: 2.8 G/DL (ref 1.9–3.5)
GLUCOSE SERPL-MCNC: 96 MG/DL (ref 65–99)
HBA1C MFR BLD: 6 % (ref 4–5.6)
HDLC SERPL-MCNC: 50 MG/DL
LDLC SERPL CALC-MCNC: 56 MG/DL
MICROALBUMIN UR-MCNC: 4.6 MG/DL
MICROALBUMIN/CREAT UR: 35 MG/G (ref 0–30)
POTASSIUM SERPL-SCNC: 4.2 MMOL/L (ref 3.6–5.5)
PROT SERPL-MCNC: 6.7 G/DL (ref 6–8.2)
SODIUM SERPL-SCNC: 139 MMOL/L (ref 135–145)
TRIGL SERPL-MCNC: 96 MG/DL (ref 0–149)
TSH SERPL-ACNC: 0.06 UIU/ML (ref 0.38–5.33)
VIT B12 SERPL-MCNC: 929 PG/ML (ref 211–911)

## 2025-04-03 PROCEDURE — 82570 ASSAY OF URINE CREATININE: CPT

## 2025-04-03 PROCEDURE — 80061 LIPID PANEL: CPT

## 2025-04-03 PROCEDURE — 84443 ASSAY THYROID STIM HORMONE: CPT

## 2025-04-03 PROCEDURE — 36415 COLL VENOUS BLD VENIPUNCTURE: CPT

## 2025-04-03 PROCEDURE — 83036 HEMOGLOBIN GLYCOSYLATED A1C: CPT | Mod: GA

## 2025-04-03 PROCEDURE — 82607 VITAMIN B-12: CPT | Mod: GA

## 2025-04-03 PROCEDURE — 80053 COMPREHEN METABOLIC PANEL: CPT

## 2025-04-03 PROCEDURE — 82043 UR ALBUMIN QUANTITATIVE: CPT

## 2025-04-03 NOTE — PROGRESS NOTES
Subjective:     CC:   Chief Complaint   Patient presents with    Follow-Up       HPI:   Sola presents today due to the fact that she is having ear pain and the cough she flew to [place] for a gathering and after that visit she got sick she has been having problems with a cough for about 5 days.  Patient was seen by her PCP June 28 at that time she was diagnosed as having an ear infection the antibiotic did help her prior to her trip.  Patient denies any fever with this.  Patient was only able to get 1 COVID vaccination due to fact she got a reaction to it.    No past medical history on file.    Social History     Tobacco Use    Smoking status: Light Smoker    Smokeless tobacco: Never    Tobacco comments:     1 per day   Vaping Use    Vaping Use: Never used   Substance Use Topics    Alcohol use: Yes     Comment: seldom    Drug use: Never       Current Outpatient Medications Ordered in Epic   Medication Sig Dispense Refill    HYDROcodone-acetaminophen (NORCO) 5-325 MG Tab per tablet Take 1 Tablet by mouth every 8 hours as needed (pain) for up to 30 days. 30 Tablet 0    escitalopram (LEXAPRO) 20 MG tablet Take 1 tablet by mouth once daily 90 Tablet 3    liothyronine (CYTOMEL) 5 MCG Tab Take 1 tablet by mouth once daily 90 Tablet 3    levothyroxine (SYNTHROID) 100 MCG Tab TAKE 1 TABLET BY MOUTH IN THE MORNING ON AN EMPTY STOMACH 90 Tablet 3    budesonide-formoterol (SYMBICORT) 160-4.5 MCG/ACT Aerosol Inhale 2 Puffs 2 times a day. 3 Each 3    rosuvastatin (CRESTOR) 5 MG Tab TAKE 1 TABLET BY MOUTH ONCE DAILY IN THE EVENING 90 Tablet 3    omeprazole (PRILOSEC) 20 MG delayed-release capsule Take 1 Capsule by mouth every day. 90 Capsule 3    tretinoin (RETIN-A) 0.025 % cream APPLY CREAM TOPICALLY TO AFFECTED AREA ONCE DAILY IN THE EVENING      predniSONE (DELTASONE) 20 MG Tab TAKE 1 TO 2 TABLETS BY MOUTH AS DIRECTED      cyclobenzaprine (FLEXERIL) 10 mg Tab Take 10 mg by mouth 3 times a day as needed.      ciprofloxacin  "(CILOXIN) 0.3 % Solution Administer 1 Drop into both eyes 2 times a day as needed for Other. for recurrent corneal ulceration      Omega-3 Fatty Acids (FISH OIL) 1200 MG Cap Take 2,400 mg by mouth every day.      Multiple Vitamins-Minerals (CENTRUM WOMEN) Tab Take 1 Tablet by mouth every day.      Cholecalciferol (VITAMIN D) 2000 UNIT Tab Take 2,000 Units by mouth every day.      Zinc 50 MG Cap Take 50 mg by mouth every day.      Calcium Carb-Cholecalciferol 600-800 MG-UNIT Tab Take 1 Tablet by mouth every day.      azithromycin (ZITHROMAX) 250 MG Tab Take two tablets on the first day, then one tablet daily for 4 days. 6 Tablet 0     No current Epic-ordered facility-administered medications on file.       Allergies:  Patient has no known allergies.    Health Maintenance: Completed    ROS:  Gen: no fevers/chills, patient has lost 3 pounds in the month  Eyes: no changes in vision  ENT: no sore throat, no hearing loss, no bloody nose  Pulm: no sob, no cough  CV: no chest pain, no palpitations  GI: no nausea/vomiting, no diarrhea  Neuro: no headaches, no numbness/tingling  Heme/Lymph: no easy bruising    Objective:     Exam:  /64 (BP Location: Left arm, Patient Position: Sitting, BP Cuff Size: Adult)   Pulse 93   Temp 36.6 °C (97.9 °F) (Temporal)   Resp 18   Ht 1.727 m (5' 8\")   Wt 88.5 kg (195 lb 3.2 oz)   SpO2 94%   BMI 29.68 kg/m²  Body mass index is 29.68 kg/m².    Gen: Alert and oriented, No apparent distress.  Skin: Warm and dry.  No obvious lesions.  Eyes: Sclera wnl Pupils normal in size  Lungs: Normal effort, CTA bilaterally, no wheezes, rhonchi, or rales.  Patient does have a very wet cough  CV: Regular rate and rhythm. No murmurs, rubs, or gallops.  Musculoskeletal: Normal gait. No extremity cyanosis, clubbing, or edema.  Neuro: Oriented to person, place and time  Psych: Mood is wnl         Assessment & Plan:     73 y.o. female with the following -     1. Acute cough  Due to her recent trip we " will go ahead and test her for COVID recommend she go home is going to take more than 30 minutes for us to get the test results back and we will contact her.  I did contact patient to let her know the COVID and influenza A and B test were both negative.  I did offer to go ahead and start her back on antibiotics but would recommend getting a chest x-ray patient was agreeable she states she will go ahead and get her chest x-ray done.       Return if symptoms worsen or fail to improve.    Please note that this dictation was created using voice recognition software. I have made every reasonable attempt to correct obvious errors, but I expect that there are errors of grammar and possibly content that I did not discover before finalizing the note.   Alert-The patient is alert, awake and responds to voice. The patient is oriented to time, place, and person. The triage nurse is able to obtain subjective information.

## 2025-04-07 ENCOUNTER — RESULTS FOLLOW-UP (OUTPATIENT)
Dept: MEDICAL GROUP | Facility: PHYSICIAN GROUP | Age: 75
End: 2025-04-07

## 2025-04-08 ENCOUNTER — APPOINTMENT (OUTPATIENT)
Dept: MEDICAL GROUP | Facility: PHYSICIAN GROUP | Age: 75
End: 2025-04-08
Payer: MEDICARE

## 2025-04-08 VITALS
OXYGEN SATURATION: 96 % | BODY MASS INDEX: 28.01 KG/M2 | HEIGHT: 68 IN | WEIGHT: 184.8 LBS | SYSTOLIC BLOOD PRESSURE: 102 MMHG | DIASTOLIC BLOOD PRESSURE: 66 MMHG | HEART RATE: 76 BPM | RESPIRATION RATE: 11 BRPM | TEMPERATURE: 97.1 F

## 2025-04-08 DIAGNOSIS — E89.0 POSTOPERATIVE HYPOTHYROIDISM: ICD-10-CM

## 2025-04-08 DIAGNOSIS — R73.03 PREDIABETES: ICD-10-CM

## 2025-04-08 DIAGNOSIS — M25.561 CHRONIC PAIN OF BOTH KNEES: ICD-10-CM

## 2025-04-08 DIAGNOSIS — Z79.891 CHRONIC USE OF OPIATE FOR THERAPEUTIC PURPOSE: ICD-10-CM

## 2025-04-08 DIAGNOSIS — F17.210 CIGARETTE NICOTINE DEPENDENCE WITHOUT COMPLICATION: ICD-10-CM

## 2025-04-08 DIAGNOSIS — M54.50 CHRONIC LOW BACK PAIN, UNSPECIFIED BACK PAIN LATERALITY, UNSPECIFIED WHETHER SCIATICA PRESENT: ICD-10-CM

## 2025-04-08 DIAGNOSIS — M25.562 CHRONIC PAIN OF BOTH KNEES: ICD-10-CM

## 2025-04-08 DIAGNOSIS — E78.5 DYSLIPIDEMIA: ICD-10-CM

## 2025-04-08 DIAGNOSIS — G89.29 CHRONIC PAIN OF BOTH KNEES: ICD-10-CM

## 2025-04-08 DIAGNOSIS — G89.29 CHRONIC LOW BACK PAIN, UNSPECIFIED BACK PAIN LATERALITY, UNSPECIFIED WHETHER SCIATICA PRESENT: ICD-10-CM

## 2025-04-08 PROCEDURE — 99214 OFFICE O/P EST MOD 30 MIN: CPT | Performed by: PHYSICIAN ASSISTANT

## 2025-04-08 PROCEDURE — 3074F SYST BP LT 130 MM HG: CPT | Performed by: PHYSICIAN ASSISTANT

## 2025-04-08 PROCEDURE — 3078F DIAST BP <80 MM HG: CPT | Performed by: PHYSICIAN ASSISTANT

## 2025-04-08 RX ORDER — HYDROCODONE BITARTRATE AND ACETAMINOPHEN 5; 325 MG/1; MG/1
1 TABLET ORAL
Qty: 90 TABLET | Refills: 0 | Status: SHIPPED | OUTPATIENT
Start: 2025-04-20 | End: 2025-07-19

## 2025-04-08 ASSESSMENT — ENCOUNTER SYMPTOMS
CHILLS: 0
SHORTNESS OF BREATH: 0
FEVER: 0

## 2025-04-08 ASSESSMENT — FIBROSIS 4 INDEX: FIB4 SCORE: 1.27

## 2025-04-08 ASSESSMENT — PATIENT HEALTH QUESTIONNAIRE - PHQ9: CLINICAL INTERPRETATION OF PHQ2 SCORE: 0

## 2025-04-08 NOTE — ASSESSMENT & PLAN NOTE
Chronic, stable.     Latest Labs:   Lab Results   Component Value Date/Time    CHOLSTRLTOT 125 04/03/2025 09:55 AM    LDL 56 04/03/2025 09:55 AM    HDL 50 04/03/2025 09:55 AM    TRIGLYCERIDE 96 04/03/2025 09:55 AM        Medications: Tolerating/continue rosuvastatin 5 mg daily    Risk calculator: The ASCVD Risk score (Anthony GALLARDO, et al., 2019) failed to calculate.

## 2025-04-08 NOTE — ASSESSMENT & PLAN NOTE
Chronic, ongoing.  Tolerating/continue hydrocodone/acetaminophen 5/325 once daily as needed.     Declines referral to orthopedics at this time.     UDS 10/21/2024, consistent.  Medication agreement signed 10/21/2024.

## 2025-04-08 NOTE — PROGRESS NOTES
SUBJECTIVE:     CC: lab follow up; pain medication refill    HPI:   Sola presents today with the following:    ASSESSMENT & PLAN by Problem:     Problem   Prediabetes    Chronic, ongoing.  Improved.  A1c:  6.0, (4/2025); 6.3 (10/2024).     Chronic Use of Opiate for Therapeutic Purpose    UDS 10/21/2024, consistent.  Medication agreement signed 10/21/2024.     Nicotine Dependence    Chronic, uncontrolled.  Still smokes 1/2-1 ppd.  Wants to quit.  Looking into a program.  Referred to lung cancer screening program 6/10/2024.     Dyslipidemia    Chronic, stable.  Tolerating/continue rosuvastatin 5 mg daily     Chronic Lower Back Pain    Chronic, ongoing.  Tolerating/continue hydrocodone/acetaminophen 5/325 once daily as needed.     Declines referral to orthopedics at this time.     UDS 10/21/2024, consistent.  Medication agreement signed 10/21/2024.     Chronic Pain of Both Knees    Chronic, ongoing.  Tolerating/continue hydrocodone/acetaminophen 5/325 once daily as needed.     Declines referral to orthopedics at this time.     UDS 10/21/2024, consistent.  Medication agreement signed 10/21/2024.        Postoperative Hypothyroidism    Chronic, uncontrolled.   Tolerating levothyroxine 100mcg and liothyronine 5mcg daily.    TSH 0.058 (4/3/2025).  Discontinue liothyronine.  Repeat labs early June 2025.         Mammogram: Due. Ordered 10/21/2024.    Lung cancer screening program: Referred 6/10/2024.  Contact information sent to patient via [employer].    UDS 10/21/2024, consistent.  Medication agreement signed 10/21/2024.    Repeat thyroid labs early June 2025.    Return to clinic around September/October 2025 for pain medicine refill.    Return in about 6 months (around 10/8/2025) for medication refill.       PDMP review shows no signs of medication abuse or misuse:      HPI:     Problem List Items Addressed This Visit       Chronic lower back pain    Chronic, ongoing.  Tolerating/continue hydrocodone/acetaminophen 5/325  "once daily as needed.     Declines referral to orthopedics at this time.     UDS 10/21/2024, consistent.  Medication agreement signed 10/21/2024.         Relevant Medications    HYDROcodone-acetaminophen (NORCO) 5-325 MG Tab per tablet (Start on 4/20/2025)    Chronic pain of both knees    Chronic, ongoing.  Tolerating/continue hydrocodone/acetaminophen 5/325 once daily as needed.     Declines referral to orthopedics at this time.     UDS 10/21/2024, consistent.  Medication agreement signed 10/21/2024.    Imaging 10/2023 shows degenerative changes in the left knee, unremarkable in the right.             Relevant Medications    HYDROcodone-acetaminophen (NORCO) 5-325 MG Tab per tablet (Start on 4/20/2025)    Chronic use of opiate for therapeutic purpose    Dyslipidemia    Chronic, stable.     Latest Labs:   Lab Results   Component Value Date/Time    CHOLSTRLTOT 125 04/03/2025 09:55 AM    LDL 56 04/03/2025 09:55 AM    HDL 50 04/03/2025 09:55 AM    TRIGLYCERIDE 96 04/03/2025 09:55 AM        Medications: Tolerating/continue rosuvastatin 5 mg daily    Risk calculator: The ASCVD Risk score (Anthony DK, et al., 2019) failed to calculate.            Nicotine dependence    Postoperative hypothyroidism    Chronic, uncontrolled.   Tolerating levothyroxine 100mcg and liothyronine 5mcg daily.    TSH 0.058 (4/3/2025).  Discontinue liothyronine.  Repeat labs early June 2025.         Relevant Orders    TSH    FREE THYROXINE    T3 FREE    Prediabetes    Chronic, ongoing.  Improved.  A1c:  6.0, (4/2025); 6.3 (10/2024).                   ROS:  Review of Systems   Constitutional:  Negative for chills and fever.   Respiratory:  Negative for shortness of breath.    Cardiovascular:  Negative for chest pain.       OBJECTIVE:     Exam:  /66   Pulse 76   Temp 36.2 °C (97.1 °F) (Temporal)   Resp (!) 11   Ht 1.727 m (5' 8\")   Wt 83.8 kg (184 lb 12.8 oz)   SpO2 96%   BMI 28.10 kg/m²  Body mass index is 28.1 kg/m².    Physical " Exam  Vitals reviewed.   Constitutional:       General: She is not in acute distress.     Appearance: Normal appearance.   Pulmonary:      Effort: Pulmonary effort is normal.   Neurological:      General: No focal deficit present.      Mental Status: She is alert.   Psychiatric:         Mood and Affect: Mood normal.         Behavior: Behavior normal.         Judgment: Judgment normal.       Consequences of Chronic Opiate therapy:  (5 A's)  Analgesia:  improved  Activity:  improved  Adverse Events:  none  Aberrant Behaviors:  none  Affect/Mood: good grooming, full facial expressions, normal speech pattern and content, normal thought patterns, normal perception, good insight, normal reasoning  Last CMP: 4/3/2025, normal  Appropriate Imaging done:   Yes, over the years    Declines referral back to any specialist at this time.      CHART REVIEW:     Labs:                  Please note that this dictation was created using voice recognition software. I have made every reasonable attempt to correct obvious errors, but I expect that there are errors of grammar and possibly content that I did not discover before finalizing the note.

## 2025-04-08 NOTE — ASSESSMENT & PLAN NOTE
Chronic, uncontrolled.   Tolerating levothyroxine 100mcg and liothyronine 5mcg daily.    TSH 0.058 (4/3/2025).  Discontinue liothyronine.  Repeat labs early June 2025.

## 2025-04-08 NOTE — ASSESSMENT & PLAN NOTE
Chronic, ongoing.  Tolerating/continue hydrocodone/acetaminophen 5/325 once daily as needed.     Declines referral to orthopedics at this time.     UDS 10/21/2024, consistent.  Medication agreement signed 10/21/2024.    Imaging 10/2023 shows degenerative changes in the left knee, unremarkable in the right.

## 2025-05-07 NOTE — PROGRESS NOTES
Subjective     Surendra Garcia is a 74 y.o. female who presents with No chief complaint on file.            HPI  Patient seen today for initial lung cancer screening visit. Patient referred by Jen CRANE.     The patient meets eligibility criteria including age, smoking history (20+ pack years), if former smoker, quit in the last 15 years, and absence of signs or symptoms of lung cancer.    - Age - 74  - Smoking history - Patient has smoked for *** years at an average of *** ppd = *** pack year smoking history.  - Current smoking status - ***  - No symptoms of lung cancer and no previous history of lung cancer     No Known Allergies    Current Outpatient Medications on File Prior to Visit   Medication Sig Dispense Refill    HYDROcodone-acetaminophen (NORCO) 5-325 MG Tab per tablet Take 1 Tablet by mouth 1 time a day as needed (pain) for up to 90 days. 90 DAY SUPPLY 90 Tablet 0    ciprofloxacin (CILOXIN) 0.3 % Solution Administer 1 Drop into both eyes every 2 hours. 10 mL 0    rosuvastatin (CRESTOR) 5 MG Tab Take 1 Tablet by mouth every evening. 90 Tablet 3    omeprazole (PRILOSEC) 20 MG delayed-release capsule Take 1 Capsule by mouth every day. 90 Capsule 3    Adapalene 0.3 % Gel Apply thin pea-sized amount to face at night; start 2-3 nights/week. Increase to daily application at night as tolerated. (Patient not taking: Reported on 4/8/2025) 45 g 6    aluminum chloride (DRYSOL) 20 % external solution Apply thin layer to scalp at night  Indications: Excessive Sweating Disorder 70 mL 3    fluticasone furoate-vilanterol (BREO ELLIPTA) 100-25 MCG/ACT AEROSOL POWDER, BREATH ACTIVATED Inhale 1 Puff every day. (Patient not taking: Reported on 4/8/2025) 1 Each 11    escitalopram (LEXAPRO) 20 MG tablet Take 1 Tablet by mouth every day. 90 Tablet 3    levothyroxine (SYNTHROID) 100 MCG Tab Take 1 Tablet by mouth every morning on an empty stomach. 90 Tablet 3    liothyronine (CYTOMEL) 5 MCG Tab Take 1 Tablet by mouth  every day. 90 Tablet 3    predniSONE (DELTASONE) 20 MG Tab Has 5 pills for emergency      cyclobenzaprine (FLEXERIL) 10 mg Tab Take 10 mg by mouth 3 times a day as needed.      Omega-3 Fatty Acids (FISH OIL) 1200 MG Cap Take 2,400 mg by mouth every day.      Multiple Vitamins-Minerals (CENTRUM WOMEN) Tab Take 1 Tablet by mouth every day.      Calcium Carb-Cholecalciferol 600-800 MG-UNIT Tab Take 1 Tablet by mouth every day. (Patient not taking: Reported on 4/8/2025)       No current facility-administered medications on file prior to visit.       ROS           Objective     There were no vitals taken for this visit.     Physical Exam                             Assessment & Plan       We conducted a shared decision-making process using a decision aid. We reviewed benefits and harms of screening, including false positives and potential need for additional diagnostic testing, the possibility of over diagnosis, and total radiation exposure.    We discussed the importance of adhering to annual LDCT screening. We also discussed the impact of comorbities on the patient's the ability or willingness to undergo diagnostic procedure(s) and treatment.    {AMB SHARED DECISION LUNG CANCER SCREENING SMOKING ABSTINENCE:2033539}    Based on our discussion, we have decided {AMB SHARED DECISION LUNG CANCER SCREENING OUTCOMES:0158514}    No follow-up needed

## 2025-05-12 ENCOUNTER — APPOINTMENT (OUTPATIENT)
Dept: SLEEP MEDICINE | Facility: MEDICAL CENTER | Age: 75
End: 2025-05-12
Attending: FAMILY MEDICINE
Payer: MEDICARE

## 2025-05-13 ENCOUNTER — OFFICE VISIT (OUTPATIENT)
Dept: URGENT CARE | Facility: PHYSICIAN GROUP | Age: 75
End: 2025-05-13
Payer: MEDICARE

## 2025-05-13 VITALS
HEIGHT: 68 IN | SYSTOLIC BLOOD PRESSURE: 118 MMHG | HEART RATE: 81 BPM | WEIGHT: 185.19 LBS | TEMPERATURE: 98.2 F | DIASTOLIC BLOOD PRESSURE: 72 MMHG | RESPIRATION RATE: 16 BRPM | OXYGEN SATURATION: 96 % | BODY MASS INDEX: 28.07 KG/M2

## 2025-05-13 DIAGNOSIS — J01.90 ACUTE BACTERIAL SINUSITIS: ICD-10-CM

## 2025-05-13 DIAGNOSIS — B96.89 ACUTE BACTERIAL SINUSITIS: ICD-10-CM

## 2025-05-13 PROCEDURE — 3074F SYST BP LT 130 MM HG: CPT | Performed by: PHYSICIAN ASSISTANT

## 2025-05-13 PROCEDURE — 99213 OFFICE O/P EST LOW 20 MIN: CPT | Performed by: PHYSICIAN ASSISTANT

## 2025-05-13 PROCEDURE — 3078F DIAST BP <80 MM HG: CPT | Performed by: PHYSICIAN ASSISTANT

## 2025-05-13 ASSESSMENT — ENCOUNTER SYMPTOMS
COUGH: 1
SPUTUM PRODUCTION: 0
MYALGIAS: 0
DIARRHEA: 0
ABDOMINAL PAIN: 0
NAUSEA: 0
CHILLS: 0
SHORTNESS OF BREATH: 0
DIAPHORESIS: 0
WHEEZING: 0
DIZZINESS: 0
HEADACHES: 0
PALPITATIONS: 0
FEVER: 0
VOMITING: 0
SORE THROAT: 1
SINUS PAIN: 1

## 2025-05-13 ASSESSMENT — FIBROSIS 4 INDEX: FIB4 SCORE: 1.27

## 2025-05-13 NOTE — PROGRESS NOTES
Subjective:     CHIEF COMPLAINT  Chief Complaint   Patient presents with    Recurrent Sinusitis     fever    Fever       HPI  Sola Garcia is a very pleasant 74 y.o. female who presents to the clinic with sinus pain and pressure that has been ongoing for the last 10 days.  Recently came down with URI-like symptoms.  States she had a fever at the onset that has resolved.  She has an occasional cough seems to be worse in the morning.  Also has a sore throat in the morning secondary to postnasal drainage that improves throughout the day.  Experiencing bilateral ear pressure right greater than left.  No shortness of breath or chest pain.  Taking Aleve as needed for symptoms.    REVIEW OF SYSTEMS  Review of Systems   Constitutional:  Negative for chills, diaphoresis, fever and malaise/fatigue.   HENT:  Positive for congestion, ear pain, sinus pain and sore throat.    Respiratory:  Positive for cough. Negative for sputum production, shortness of breath and wheezing.    Cardiovascular:  Negative for chest pain and palpitations.   Gastrointestinal:  Negative for abdominal pain, diarrhea, nausea and vomiting.   Musculoskeletal:  Negative for myalgias.   Neurological:  Negative for dizziness and headaches.   Endo/Heme/Allergies:  Positive for environmental allergies.       PAST MEDICAL HISTORY  Patient Active Problem List    Diagnosis Date Noted    Advance care planning 10/21/2024    Excessive sweating 08/13/2024    Cataracts, bilateral 06/10/2024    Prediabetes 04/16/2024    Chronic use of opiate for therapeutic purpose 01/31/2024    Headache 01/31/2024    Nicotine dependence 10/03/2023    Dyslipidemia 12/03/2021    Chronic lower back pain 10/14/2021    Chronic pain of both knees 10/14/2021    Allergic reaction to COVID-19 vaccine 10/14/2021    Postoperative hypothyroidism 10/12/2021    GERD (gastroesophageal reflux disease) 10/12/2021    Major depressive disorder, recurrent episode, mild (HCC) 10/12/2021    Asthma  10/12/2021       SURGICAL HISTORY   has a past surgical history that includes thyroidectomy (2020); cholecystectomy (2018); knee arthroscopy (Right, 2020); abdominal hysterectomy total (1986); knee arthroscopy (Left, 2013); and sinusotomies (2001).    ALLERGIES  Not on File    CURRENT MEDICATIONS  Home Medications       Reviewed by Don Cowan P.A.-C. (Physician Assistant) on 05/13/25 at 1324  Med List Status: <None>     Medication Last Dose Status   Adapalene 0.3 % Gel Taking Active   aluminum chloride (DRYSOL) 20 % external solution Taking Active   Calcium Carb-Cholecalciferol 600-800 MG-UNIT Tab Taking Active   ciprofloxacin (CILOXIN) 0.3 % Solution Taking Active   cyclobenzaprine (FLEXERIL) 10 mg Tab Taking Active   escitalopram (LEXAPRO) 20 MG tablet Taking Active   fluticasone furoate-vilanterol (BREO ELLIPTA) 100-25 MCG/ACT AEROSOL POWDER, BREATH ACTIVATED Taking Active   HYDROcodone-acetaminophen (NORCO) 5-325 MG Tab per tablet Taking Active   levothyroxine (SYNTHROID) 100 MCG Tab Taking Active   liothyronine (CYTOMEL) 5 MCG Tab Taking Active   Multiple Vitamins-Minerals (CENTRUM WOMEN) Tab Taking Active   Omega-3 Fatty Acids (FISH OIL) 1200 MG Cap Taking Active   omeprazole (PRILOSEC) 20 MG delayed-release capsule Taking Active   predniSONE (DELTASONE) 20 MG Tab Taking Active   rosuvastatin (CRESTOR) 5 MG Tab Taking Active                    SOCIAL HISTORY  Social History     Tobacco Use    Smoking status: Every Day     Current packs/day: 1.00     Average packs/day: 1 pack/day for 50.0 years (50.0 ttl pk-yrs)     Types: Cigarettes    Smokeless tobacco: Never    Tobacco comments:     3 or more Cigarettes per day   Vaping Use    Vaping status: Never Used   Substance and Sexual Activity    Alcohol use: Yes     Comment: seldom    Drug use: Never    Sexual activity: Not Currently       FAMILY HISTORY  Family History   Problem Relation Age of Onset    Alzheimer's Disease Mother     Heart Disease Father      "Cancer Maternal Grandmother           Objective:     VITAL SIGNS: /72 (BP Location: Left arm, Patient Position: Sitting, BP Cuff Size: Adult)   Pulse 81   Temp 36.8 °C (98.2 °F) (Temporal)   Resp 16   Ht 1.727 m (5' 8\")   Wt 84 kg (185 lb 3 oz)   SpO2 96%   BMI 28.16 kg/m²     PHYSICAL EXAM  Physical Exam  Constitutional:       General: She is not in acute distress.     Appearance: Normal appearance. She is not ill-appearing, toxic-appearing or diaphoretic.   HENT:      Head: Normocephalic and atraumatic.      Right Ear: Tympanic membrane, ear canal and external ear normal.      Left Ear: Tympanic membrane, ear canal and external ear normal.      Nose: Congestion present. No rhinorrhea.      Mouth/Throat:      Mouth: Mucous membranes are moist.      Pharynx: No oropharyngeal exudate or posterior oropharyngeal erythema.      Comments: Purulent postnasal drainage.  No posterior oropharynx erythema or edema.  Uvula midline without deviation.  No trismus.  Eyes:      Conjunctiva/sclera: Conjunctivae normal.   Cardiovascular:      Rate and Rhythm: Normal rate and regular rhythm.      Pulses: Normal pulses.      Heart sounds: Normal heart sounds.   Pulmonary:      Effort: Pulmonary effort is normal.      Breath sounds: Normal breath sounds. No wheezing, rhonchi or rales.   Musculoskeletal:      Cervical back: Normal range of motion. No muscular tenderness.   Lymphadenopathy:      Cervical: No cervical adenopathy.   Skin:     General: Skin is warm and dry.      Capillary Refill: Capillary refill takes less than 2 seconds.   Neurological:      Mental Status: She is alert.   Psychiatric:         Mood and Affect: Mood normal.         Thought Content: Thought content normal.         Assessment/Plan:     1. Acute bacterial sinusitis  - amoxicillin-clavulanate (AUGMENTIN) 875-125 MG Tab; Take 1 Tablet by mouth 2 times a day for 7 days.  Dispense: 14 Tablet; Refill: 0      MDM/Comments:    -Nasal spray and allergy " medications as directed (Zyrtec or Loratadine).  -You may try saline irrigation or neti pot.   -Drink plenty of fluids.  -Ibuprofen or Tylenol as directed for pain.   -Warm compress to sinuses.      Follow up with primary care provider. Urgently for worsening symptoms, persistent fevers, facial swelling, visual changes, weakness, elevated heart rate, stiff neck, symptoms last longer than 10 days, or any other concerns.    Differential diagnosis, natural history, supportive care, and indications for immediate follow-up discussed. All questions answered. Patient agrees with the plan of care.    Follow-up as needed if symptoms worsen or fail to improve to PCP, Urgent care or Emergency Room.    I have personally reviewed prior external notes and test results pertinent to today's visit.  I have independently reviewed and interpreted all diagnostics ordered during this urgent care acute visit.   Discussed management options (risks,benefits, and alternatives to treatment). Pt expresses understanding and the treatment plan was agreed upon. Questions were encouraged and answered to pt's satisfaction.    Please note that this dictation was created using voice recognition software. I have made a reasonable attempt to correct obvious errors, but I expect that there are errors of grammar and possibly content that I did not discover before finalizing the note.

## 2025-06-04 DIAGNOSIS — M54.50 CHRONIC LOW BACK PAIN, UNSPECIFIED BACK PAIN LATERALITY, UNSPECIFIED WHETHER SCIATICA PRESENT: ICD-10-CM

## 2025-06-04 DIAGNOSIS — G89.29 CHRONIC LOW BACK PAIN, UNSPECIFIED BACK PAIN LATERALITY, UNSPECIFIED WHETHER SCIATICA PRESENT: ICD-10-CM

## 2025-06-04 RX ORDER — HYDROCODONE BITARTRATE AND ACETAMINOPHEN 5; 325 MG/1; MG/1
1 TABLET ORAL
Qty: 90 TABLET | Refills: 0 | Status: SHIPPED | OUTPATIENT
Start: 2025-06-04 | End: 2025-09-02

## 2025-06-20 ENCOUNTER — HOSPITAL ENCOUNTER (OUTPATIENT)
Dept: LAB | Facility: MEDICAL CENTER | Age: 75
End: 2025-06-20
Attending: PHYSICIAN ASSISTANT
Payer: MEDICARE

## 2025-06-20 DIAGNOSIS — E89.0 POSTOPERATIVE HYPOTHYROIDISM: ICD-10-CM

## 2025-06-20 LAB
T3FREE SERPL-MCNC: 2.82 PG/ML (ref 2–4.4)
T4 FREE SERPL-MCNC: 1.66 NG/DL (ref 0.93–1.7)
TSH SERPL-ACNC: 0.14 UIU/ML (ref 0.38–5.33)

## 2025-06-20 PROCEDURE — 36415 COLL VENOUS BLD VENIPUNCTURE: CPT

## 2025-06-20 PROCEDURE — 84443 ASSAY THYROID STIM HORMONE: CPT

## 2025-06-20 PROCEDURE — 84439 ASSAY OF FREE THYROXINE: CPT

## 2025-06-20 PROCEDURE — 84481 FREE ASSAY (FT-3): CPT

## 2025-06-22 ENCOUNTER — RESULTS FOLLOW-UP (OUTPATIENT)
Dept: MEDICAL GROUP | Facility: PHYSICIAN GROUP | Age: 75
End: 2025-06-22

## 2025-06-22 DIAGNOSIS — E89.0 POSTOPERATIVE HYPOTHYROIDISM: Primary | ICD-10-CM

## 2025-06-22 RX ORDER — LEVOTHYROXINE SODIUM 88 UG/1
88 TABLET ORAL
Qty: 90 TABLET | Refills: 3 | Status: SHIPPED | OUTPATIENT
Start: 2025-06-22

## 2025-07-30 DIAGNOSIS — F41.9 ANXIETY: ICD-10-CM

## 2025-07-30 NOTE — TELEPHONE ENCOUNTER
Received request via: Pharmacy    Was the patient seen in the last year in this department? Yes    Does the patient have an active prescription (recently filled or refills available) for medication(s) requested? No    Pharmacy Name: [facility]    Does the patient have [facility] and need 100-day supply? (This applies to ALL medications) Patient does not have SCP  
Denies family hx/unknown

## 2025-07-31 RX ORDER — ESCITALOPRAM OXALATE 20 MG/1
20 TABLET ORAL DAILY
Qty: 90 TABLET | Refills: 0 | Status: SHIPPED | OUTPATIENT
Start: 2025-07-31

## 2025-08-25 ENCOUNTER — APPOINTMENT (OUTPATIENT)
Dept: LAB | Facility: MEDICAL CENTER | Age: 75
End: 2025-08-25
Payer: MEDICARE